# Patient Record
Sex: MALE | Race: WHITE | NOT HISPANIC OR LATINO
[De-identification: names, ages, dates, MRNs, and addresses within clinical notes are randomized per-mention and may not be internally consistent; named-entity substitution may affect disease eponyms.]

---

## 2020-12-29 ENCOUNTER — TRANSCRIBE ORDERS (OUTPATIENT)
Dept: OTHER | Age: 55
End: 2020-12-29

## 2020-12-29 DIAGNOSIS — I47.20 VENTRICULAR TACHYCARDIA (H): ICD-10-CM

## 2020-12-29 DIAGNOSIS — D86.85 CARDIAC SARCOIDOSIS: Primary | ICD-10-CM

## 2020-12-30 ENCOUNTER — CARE COORDINATION (OUTPATIENT)
Dept: CARDIOLOGY | Facility: CLINIC | Age: 55
End: 2020-12-30

## 2020-12-30 NOTE — PROGRESS NOTES
Referral- Heart Failure    REFERRING CLINIC INFORMATION:    Referring Clinic: [facility]  Referring Provider: Shoaib Moreira  Provider Contact Info: [phone]  Nursing Care Team Contact Info:     REFERRING PATIENT INFORMATION:    Patient Phone Number:   Home Phone [phone]   Mobile [phone]      Consent to Communicate: NOne found  Insurance Obtained: Yes       PATIENT/REFERRAL Hx:       December 30, 2020  Received staff message. Possible sarcoid. Recent imaging results are in CareEverywhere      IMAGING REQUESTED      December 30, 2020 - MRI 12/20 and Echo 12/20 have been pushed.   December 30, 2020 - Images received and in PACS    PLAN:       Sending to [facility] Nurse to Triage      ATTEMPTS TO CONTACT:  1. December 30, 2020 - None attempted yet.   2. January 6, 2021 - appointment made 1/19/21 with Dr. Garza.     Brad Burnette, [facility]  Heart Failure, Advanced Heart Failure & CORE  Referral Specialist &     [facility]  Cardiology  Office: [phone]  [phone]

## 2020-12-31 PROBLEM — E11.9 DM TYPE 2 (DIABETES MELLITUS, TYPE 2) (H): Status: ACTIVE | Noted: 2017-12-06

## 2020-12-31 PROBLEM — Z86.19 HISTORY OF HEPATITIS C: Status: ACTIVE | Noted: 2018-07-30

## 2020-12-31 PROBLEM — D12.6 ADENOMATOUS POLYP OF COLON: Status: ACTIVE | Noted: 2019-03-08

## 2020-12-31 PROBLEM — D69.6 THROMBOCYTOPENIA (H): Status: ACTIVE | Noted: 2018-10-24

## 2020-12-31 PROBLEM — R00.0 WIDE-COMPLEX TACHYCARDIA: Status: ACTIVE | Noted: 2020-12-24

## 2020-12-31 PROBLEM — K74.00 FIBROSIS OF LIVER: Status: ACTIVE | Noted: 2019-02-14

## 2020-12-31 RX ORDER — LANCETS
EACH MISCELLANEOUS
COMMUNITY
Start: 2020-04-10

## 2020-12-31 RX ORDER — ALBUTEROL SULFATE 90 UG/1
2 AEROSOL, METERED RESPIRATORY (INHALATION) EVERY 4 HOURS PRN
COMMUNITY
Start: 2020-09-14

## 2020-12-31 RX ORDER — PEN NEEDLE, DIABETIC 32GX 5/32"
NEEDLE, DISPOSABLE MISCELLANEOUS
COMMUNITY
Start: 2020-10-16

## 2020-12-31 RX ORDER — BLOOD SUGAR DIAGNOSTIC
STRIP MISCELLANEOUS
COMMUNITY
Start: 2020-10-16

## 2020-12-31 RX ORDER — IBUPROFEN 600 MG/1
600 TABLET, FILM COATED ORAL EVERY 6 HOURS PRN
COMMUNITY
Start: 2020-07-30

## 2020-12-31 RX ORDER — FLUTICASONE PROPIONATE 50 MCG
SPRAY, SUSPENSION (ML) NASAL
COMMUNITY
Start: 2020-09-14

## 2021-01-11 ENCOUNTER — CARE COORDINATION (OUTPATIENT)
Dept: CARDIOLOGY | Facility: CLINIC | Age: 56
End: 2021-01-11

## 2021-01-11 NOTE — PROGRESS NOTES
Patient is referred from Dr. Shoaib Moreira at /[facility] for possible cardiac sarcoid.  Recently hospitalized end of December (see note 12/122/20) with wide complex tachycardia. All records in care everywhere    1/11 - Called [facility] Imaging Dept and verified that patient's imaging (cardiac MRI and echo 12/2020) have been downloaded into our system. They are available for patient's video appointment on 1/19/20 with Dr. Garza. All notes and print results are in Care Everywhere.

## 2021-01-11 NOTE — TELEPHONE ENCOUNTER
Action    Action Taken 1-11: Requested from [facility]:    Angiogram    Cardiac MRI   12-23-20 12-24-20   1-11: Requested echo 12-23-20 from [facility] echo fax  1-12: echo and cMRI are in PACS  1-12: resolved angiogram in PACS

## 2021-01-17 NOTE — PROGRESS NOTES
"Brad Bruno is a 56 year old male who is being evaluated via a billable telephone visit.      The patient has been notified of following:   \"This telephone visit will be conducted via a call between you and your physician/provider. We have found that certain health care needs can be provided without the need for a physical exam.  This service lets us provide the care you need with a short phone conversation.  If a prescription is necessary we can send it directly to your pharmacy.  If lab work is needed we can place an order for that and you can then stop by our lab to have the test done at a later time. Telephone visits are billed at different rates depending on your insurance coverage. During this emergency period, for some insurers they may be billed the same as an in-person visit.  Please reach out to your insurance provider with any questions. If during the course of the call the physician/provider feels a telephone visit is not appropriate, you will not be charged for this service.\"    Patient has given verbal consent for Telephone visit?  YES    Telephone Visit Details  Type of service:  Telephone Visit  Telephone visit start time: 2pm  Telephone end time:2:35pm   Total telephone time: 35 minutes    Chief complaint: wide complex tachycardia     HPI:   Brad Bruno is a 56 year old male with history of DM Type 2, Liver fibrosis, VT s/p ICD 12/2020, NICM who presents for possible cardiac sarcoid evaluation.      presented to the ER on 12/2020 for palpitations and chest pain. EKG showed evidence of VT. He was then transferred to [facility] for further evaluation. Coronary angiogram showed no evidence of coronary artery disease. TTE showed borderline LVEF 50% and inferolateral WMA. EP study was done which showed VT and PVC with varying morphology. Cardiac MRI was done which showed various regions of late gadolinium enhancement suggestive of non ischemic cardiomyopathy and possible sarcoid " disease. He was not discharged on anti-arrhythmic agents or beta blockers and was advised outpatient follow-up.     Patient reports that he has no known cardiac condition prior to this. His job requires him to be physically active and he has had no limitations prior to his admission in 12/2020 and after his hospitalization. 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     He denies any chest pain, dyspnea at rest or with exertion, PND, orthopnea, peripheral edema, palpitations, lightheadedness or syncope. No reported ICD shocks    PAST MEDICAL HISTORY:  Liver fibrosis   DM type 2     CURRENT MEDICATIONS:  Current Outpatient Medications   Medication Sig Dispense Refill     albuterol (PROAIR HFA/PROVENTIL HFA/VENTOLIN HFA) 108 (90 Base) MCG/ACT inhaler Inhale 2 puffs into the lungs every 4 hours as needed for wheezing       blood glucose (ACCU-CHEK GUIDE) test strip USE TO TEST 3 TIMES DAILY.       blood glucose monitoring (ACCU-CHEK FASTCLIX) lancets USE TO TEST BLOOD SUGAR THREE TIMES A DAY       fluticasone (FLONASE) 50 MCG/ACT nasal spray PLACE 2 SPRAYS INTO BOTH NOSTRILS D. USE UP TO 1 MONTH       ibuprofen (ADVIL/MOTRIN) 600 MG tablet Take 600 mg by mouth every 6 hours as needed for pain       insulin glargine (LANTUS SOLOSTAR) 100 UNIT/ML pen Inject 15 Units Subcutaneous every evening       insulin pen needle (BD RONAL U/F) 32G X 4 MM miscellaneous        sitagliptin (JANUVIA) 50 MG tablet Take 50 mg by mouth daily         PAST SURGICAL HISTORY:  No past surgical history on file.    ALLERGIES:   No Known Allergies    FAMILY HISTORY:  No family history of premature CAD or sudden death.  Dad colon cancer  Mom breast cancer, lymphoma    SOCIAL HISTORY:  Social History     Tobacco Use     Smoking status: Not on file   Substance Use Topics     Alcohol use: Not on file     Drug use: Not on file   Medical marijuana for sleep     ROS:   A comprehensive  14 point review of systems is negative other than as mentioned in HPI.    Exam:  There were no vitals taken for this visit.     The rest of a comprehensive physical examination is deferred due to PHE (public health emergency) telephone visit restrictions.    Labs:  Reviewed.     Testing/Procedures:    I personally visualized and interpreted:  Outside records from [facility] were obtained, and relevant results/notes have been incorporated into HPI.    Outside results of note: [facility]   12/23 TTE:  Summary  1. Normal LV size with low normal systolic function. EF 50-55%.  Inferolateral wall is hypokinetic.  2. Mildly dilated aortic root at 4.5 cm and ascending aorta at 4 cm.  3. Normal valve function and structure.  4. Normal RV size and function.  5. Mild LA dilatation.  6. No prior studies for comparison.    12/23 Coronary Angiogram:  Conclusions  1. Hx of AFib vs VT, wall motion abnormality on echo.  2. Normal coronary arteries.  3. LVEDP 3mmHg.    12/24 MR Cardiac w/wo contrast:  Conclusions:   1. Mild left ventricular enlargement. Normal left ventricular wall   thickness. The BMI-adjusted left   ventricular mass is normal. Mildly reduced left ventricular systolic   function, LVEF 51%. Basal   inferolateral hypokinesis. Base to mid inferolateral, basal inferior mid   myocardial and basal septal late   gadolinium enhancement noted. Small area of distal inferolateral and mid   anterolateral subendocardial   late gadolinium enhancement. Near transmural basal anterolateral late   gadolinium enhancement. Overall   findings suggestive of non-ischemic cardiomyopathy (consider Sarcoid   Cardiomyopathy).   2. Normal RV size and function.   3. Mild to moderate biatrial enlargement.   4. Dilated aortic root measures 4.2 cm at the level of the Sinus of   Valsalva.     Procedures:   12/23 EPS revealing inducible VT with different morphology from clinical arrhythmia and also pleomorphic PVCs. Patient also with  significant AVN conduction disease with AV block at low atrial pacing rates.    12/24 Dual chamber ICD implant:   Conclusions   Successful dual chamber ICDimplant   VF induction with T wave shock --> VF--> VT    (LBB morphology , LAD)--> shock 15 J --> NSR   VF induction with 50 Hz pacing--> VF--->15 J shock-->   NSR     Assessment and Plan:   Brad Bruno is a 56 year old male with history of DM Type 2, Liver fibrosis, VT s/p ICD 12/2020, NICM who presents for possible cardiac sarcoid evaluation.     1. VT, polymorphic s/p Medtronic dual chamber ICD   2. Possible cardiac sarcoidosis   Patient presented to [facility] with VT. EP study showed inducible VT and PVC of varying morphology.No evidence of coronary disease or angiogram. He underwent DCICD placement on 12/24/20. LVEF was noted to be at 50%. Cardiac MRI is suggestive of non-ischemic cardiomyopathy possibly sarcoidosis although findings are not definitive. He is currently not on any anti-arrhythmics or beta blocker. He is doing well and is asymptomatic with no limitation in his physical activities. Before proceeding with long-term steroids, we will proceed with further evaluation as outlined below.     - PET scan to evaluate for possible cardiac sarcoidosis or other extracardiac tissue that may allow for biopsy   - If results of PET scan do not show any evidence of sarcoidosis, will proceed with genetic testing     Follow-up after PET scan     The patient states understanding and is agreeable with plan.   Plan discussed with Dr.Markowitz Yamile Hand MD  PGY 5 Cardiology     ATTENDING ATTESTATION     This telephone visit was conducted jointly with Dr. Hand; I was present for its full duration. Patient was seen and evaluated with Dr. Hand. History was confirmed personally by me. Labs, imaging studies, EKGs, and telemetry were reviewed. Agree with assessment and plan as outlined above. The note has been edited by me as needed to  produce a single, cohesive document.     Total telephone time: 35 minutes    Sean Garza MD  Staff Cardiologist    [facility]

## 2021-01-19 ENCOUNTER — VIRTUAL VISIT (OUTPATIENT)
Dept: CARDIOLOGY | Facility: CLINIC | Age: 56
End: 2021-01-19
Attending: INTERNAL MEDICINE
Payer: COMMERCIAL

## 2021-01-19 ENCOUNTER — PRE VISIT (OUTPATIENT)
Dept: CARDIOLOGY | Facility: CLINIC | Age: 56
End: 2021-01-19

## 2021-01-19 DIAGNOSIS — D86.85 CARDIAC SARCOIDOSIS: Primary | ICD-10-CM

## 2021-01-19 PROCEDURE — 99203 OFFICE O/P NEW LOW 30 MIN: CPT | Mod: TEL | Performed by: INTERNAL MEDICINE

## 2021-01-19 NOTE — NURSING NOTE
Chief Complaint   Patient presents with     Palpitations     I47.2 (ICD-10-CM) - Ventricular tachycardia (H),Cardiac sarcoidosis, patient states no heart symptoms        Initial There were no vitals taken for this visit. There is no height or weight on file to calculate BMI..  BP completed using cuff size: large    Meg Wade L.P.N.

## 2021-01-19 NOTE — PATIENT INSTRUCTIONS
Preventive Care:    Colorectal Cancer Screening: During our visit today, we discussed that it is recommended you receive colorectal cancer screening. Please call or make an appointment with your primary care provider to discuss this. You may also call the [facility] scheduling line ([phone]) to set up a colonoscopy appointment.    You had a telephone visit with Dr. Sean Garza  Here are your Instructions:  1. You will need to undergo PET scan for further evaluation of possible cardiac sarcoidosis

## 2021-01-19 NOTE — NURSING NOTE
Scheduling for sarcoid PET testing.  Message sent to [facility]. Brad has no questions at this time.  Will schedule follow for after PET testing.

## 2021-01-19 NOTE — LETTER
"1/19/2021      RE: Brad Bruno  [address] [address]       Dear Colleague,    Thank you for the opportunity to participate in the care of your patient, Brad Bruno, at the [facility]. Please see a copy of my visit note below.    Brad Bruno is a 56 year old male who is being evaluated via a billable telephone visit.      The patient has been notified of following:   \"This telephone visit will be conducted via a call between you and your physician/provider. We have found that certain health care needs can be provided without the need for a physical exam.  This service lets us provide the care you need with a short phone conversation.  If a prescription is necessary we can send it directly to your pharmacy.  If lab work is needed we can place an order for that and you can then stop by our lab to have the test done at a later time. Telephone visits are billed at different rates depending on your insurance coverage. During this emergency period, for some insurers they may be billed the same as an in-person visit.  Please reach out to your insurance provider with any questions. If during the course of the call the physician/provider feels a telephone visit is not appropriate, you will not be charged for this service.\"    Patient has given verbal consent for Telephone visit?  YES    Telephone Visit Details  Type of service:  Telephone Visit  Telephone visit start time: 2pm  Telephone end time:2:35pm   Total telephone time: 35 minutes    Chief complaint: wide complex tachycardia     HPI:   Brad Bruno is a 56 year old male with history of DM Type 2, Liver fibrosis, VT s/p ICD 12/2020, NICM who presents for possible cardiac sarcoid evaluation.      presented to the ER on 12/2020 for palpitations and chest pain. EKG showed evidence of VT. He was then transferred to [facility] for further " evaluation. Coronary angiogram showed no evidence of coronary artery disease. TTE showed borderline LVEF 50% and inferolateral WMA. EP study was done which showed VT and PVC with varying morphology. Cardiac MRI was done which showed various regions of late gadolinium enhancement suggestive of non ischemic cardiomyopathy and possible sarcoid disease. He was not discharged on anti-arrhythmic agents or beta blockers and was advised outpatient follow-up.     Patient reports that he has no known cardiac condition prior to this. His job requires him to be physically active and he has had no limitations prior to his admission in 12/2020 and after his hospitalization. 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     He denies any chest pain, dyspnea at rest or with exertion, PND, orthopnea, peripheral edema, palpitations, lightheadedness or syncope. No reported ICD shocks    PAST MEDICAL HISTORY:  Liver fibrosis   DM type 2     CURRENT MEDICATIONS:  Current Outpatient Medications   Medication Sig Dispense Refill     albuterol (PROAIR HFA/PROVENTIL HFA/VENTOLIN HFA) 108 (90 Base) MCG/ACT inhaler Inhale 2 puffs into the lungs every 4 hours as needed for wheezing       blood glucose (ACCU-CHEK GUIDE) test strip USE TO TEST 3 TIMES DAILY.       blood glucose monitoring (ACCU-CHEK FASTCLIX) lancets USE TO TEST BLOOD SUGAR THREE TIMES A DAY       fluticasone (FLONASE) 50 MCG/ACT nasal spray PLACE 2 SPRAYS INTO BOTH NOSTRILS D. USE UP TO 1 MONTH       ibuprofen (ADVIL/MOTRIN) 600 MG tablet Take 600 mg by mouth every 6 hours as needed for pain       insulin glargine (LANTUS SOLOSTAR) 100 UNIT/ML pen Inject 15 Units Subcutaneous every evening       insulin pen needle (BD RONAL U/F) 32G X 4 MM miscellaneous        sitagliptin (JANUVIA) 50 MG tablet Take 50 mg by mouth daily         PAST SURGICAL HISTORY:  No past surgical history on file.    ALLERGIES:   No Known  Allergies    FAMILY HISTORY:  No family history of premature CAD or sudden death.  Dad colon cancer  Mom breast cancer, lymphoma    SOCIAL HISTORY:  Social History     Tobacco Use     Smoking status: Not on file   Substance Use Topics     Alcohol use: Not on file     Drug use: Not on file   Medical marijuana for sleep     ROS:   A comprehensive 14 point review of systems is negative other than as mentioned in HPI.    Exam:  There were no vitals taken for this visit.     The rest of a comprehensive physical examination is deferred due to PHE (public health emergency) telephone visit restrictions.    Labs:  Reviewed.     Testing/Procedures:    I personally visualized and interpreted:  Outside records from [facility] were obtained, and relevant results/notes have been incorporated into HPI.    Outside results of note: [facility]   12/23 TTE:  Summary  1. Normal LV size with low normal systolic function. EF 50-55%.  Inferolateral wall is hypokinetic.  2. Mildly dilated aortic root at 4.5 cm and ascending aorta at 4 cm.  3. Normal valve function and structure.  4. Normal RV size and function.  5. Mild LA dilatation.  6. No prior studies for comparison.    12/23 Coronary Angiogram:  Conclusions  1. Hx of AFib vs VT, wall motion abnormality on echo.  2. Normal coronary arteries.  3. LVEDP 3mmHg.    12/24 MR Cardiac w/wo contrast:  Conclusions:   1. Mild left ventricular enlargement. Normal left ventricular wall   thickness. The BMI-adjusted left   ventricular mass is normal. Mildly reduced left ventricular systolic   function, LVEF 51%. Basal   inferolateral hypokinesis. Base to mid inferolateral, basal inferior mid   myocardial and basal septal late   gadolinium enhancement noted. Small area of distal inferolateral and mid   anterolateral subendocardial   late gadolinium enhancement. Near transmural basal anterolateral late   gadolinium enhancement. Overall   findings suggestive of non-ischemic cardiomyopathy  (consider Sarcoid   Cardiomyopathy).   2. Normal RV size and function.   3. Mild to moderate biatrial enlargement.   4. Dilated aortic root measures 4.2 cm at the level of the Sinus of   Valsalva.     Procedures:   12/23 EPS revealing inducible VT with different morphology from clinical arrhythmia and also pleomorphic PVCs. Patient also with significant AVN conduction disease with AV block at low atrial pacing rates.    12/24 Dual chamber ICD implant:   Conclusions   Successful dual chamber ICDimplant   VF induction with T wave shock --> VF--> VT    (LBB morphology , LAD)--> shock 15 J --> NSR   VF induction with 50 Hz pacing--> VF--->15 J shock-->   NSR     Assessment and Plan:   Brad Bruno is a 56 year old male with history of DM Type 2, Liver fibrosis, VT s/p ICD 12/2020, NICM who presents for possible cardiac sarcoid evaluation.     1. VT, polymorphic s/p Medtronic dual chamber ICD   2. Possible cardiac sarcoidosis   Patient presented to [facility] with VT. EP study showed inducible VT and PVC of varying morphology.No evidence of coronary disease or angiogram. He underwent DCICD placement on 12/24/20. LVEF was noted to be at 50%. Cardiac MRI is suggestive of non-ischemic cardiomyopathy possibly sarcoidosis although findings are not definitive. He is currently not on any anti-arrhythmics or beta blocker. He is doing well and is asymptomatic with no limitation in his physical activities. Before proceeding with long-term steroids, we will proceed with further evaluation as outlined below.     - PET scan to evaluate for possible cardiac sarcoidosis or other extracardiac tissue that may allow for biopsy   - If results of PET scan do not show any evidence of sarcoidosis, will proceed with genetic testing     Follow-up after PET scan     The patient states understanding and is agreeable with plan.   Plan discussed with Dr.Markowitz Yamile Hand MD  PGY 5 Cardiology     ATTENDING ATTESTATION      This telephone visit was conducted jointly with Dr. Hand; I was present for its full duration. Patient was seen and evaluated with Dr. Hand. History was confirmed personally by me. Labs, imaging studies, EKGs, and telemetry were reviewed. Agree with assessment and plan as outlined above. The note has been edited by me as needed to produce a single, cohesive document.     Total telephone time: 35 minutes    Sean Garza MD  Staff Cardiologist    [facility]            Please do not hesitate to contact me if you have any questions/concerns.     Sincerely,     Sean Garza MD

## 2021-01-22 ENCOUNTER — PATIENT OUTREACH (OUTPATIENT)
Dept: CARDIOLOGY | Facility: CLINIC | Age: 56
End: 2021-01-22

## 2021-01-22 NOTE — TELEPHONE ENCOUNTER
Confirmed with Brad that he had PET scheduled and found in person Sarcoid spot to meet with Dr Garza and go over results- 3/2 at 11:30, labs at 11.  Will need to send notification when scheduled.

## 2021-02-11 ENCOUNTER — ANCILLARY PROCEDURE (OUTPATIENT)
Dept: PET IMAGING | Facility: CLINIC | Age: 56
End: 2021-02-11
Attending: INTERNAL MEDICINE
Payer: COMMERCIAL

## 2021-02-11 DIAGNOSIS — D86.85 CARDIAC SARCOIDOSIS: ICD-10-CM

## 2021-02-11 LAB — GLUCOSE SERPL-MCNC: 113 MG/DL (ref 70–99)

## 2021-02-12 ENCOUNTER — PATIENT OUTREACH (OUTPATIENT)
Dept: CARDIOLOGY | Facility: CLINIC | Age: 56
End: 2021-02-12

## 2021-02-12 DIAGNOSIS — I50.22 CHRONIC SYSTOLIC HEART FAILURE (H): Primary | ICD-10-CM

## 2021-02-12 NOTE — TELEPHONE ENCOUNTER
Reviewed PET results with Dr Garza- noted that EF was 38%, last noted EF in December in 50's.      Date: 2/12/2021    Time of Call: 11:03 AM     Diagnosis:  Heart failure     [ VORB ] Ordering provider: Dr SELAM Garza  Order: Complete Echo     Order received by: Iliana Rai RN       Follow-up/additional notes: went over testing results with Brad, explained reasoning behind Echo.  Scheduled echo on agreed date and sent [employer] message to Brad per his request with time and location.

## 2021-02-24 ENCOUNTER — ANCILLARY PROCEDURE (OUTPATIENT)
Dept: CARDIOLOGY | Facility: CLINIC | Age: 56
End: 2021-02-24
Attending: INTERNAL MEDICINE
Payer: COMMERCIAL

## 2021-02-24 DIAGNOSIS — I50.22 CHRONIC SYSTOLIC HEART FAILURE (H): ICD-10-CM

## 2021-02-24 PROCEDURE — 93306 TTE W/DOPPLER COMPLETE: CPT | Performed by: INTERNAL MEDICINE

## 2021-02-26 ENCOUNTER — PATIENT OUTREACH (OUTPATIENT)
Dept: CARDIOLOGY | Facility: CLINIC | Age: 56
End: 2021-02-26

## 2021-02-26 ENCOUNTER — TEAM CONFERENCE (OUTPATIENT)
Dept: CARDIOLOGY | Facility: CLINIC | Age: 56
End: 2021-02-26

## 2021-02-26 DIAGNOSIS — D86.85 CARDIAC SARCOIDOSIS: Primary | ICD-10-CM

## 2021-02-26 DIAGNOSIS — I42.8 NICM (NONISCHEMIC CARDIOMYOPATHY) (H): Primary | ICD-10-CM

## 2021-02-26 NOTE — TELEPHONE ENCOUNTER
Sarcoid Multidisciplinary Conference      Patient name: Brad KAUFFMAN Kiana    Physician: Sean Garza    Question for multidisciplinary group:  No sarcoid, plan for follow up?     Relevant medical history: VT in 12/2020, ICD placed, CMRI showed late gadolinium enhancement    Radiology interpretation: Nothing to suggest sarcoid in the PET or on the MRI Demetrio Bell MD,     Plan: Follow up with Dr Sage for EP and with Spring Ramirez     Updated patient, he was agreeable to the plan but asked that his follow up be pushed back.  He will call and reschedule

## 2021-03-01 ASSESSMENT — ENCOUNTER SYMPTOMS
LEG PAIN: 0
HYPOTENSION: 0
SLEEP DISTURBANCES DUE TO BREATHING: 0
ORTHOPNEA: 0
SYNCOPE: 0
LIGHT-HEADEDNESS: 0
HYPERTENSION: 0
PALPITATIONS: 1
EXERCISE INTOLERANCE: 0

## 2021-03-02 ENCOUNTER — TELEPHONE (OUTPATIENT)
Dept: CARDIOLOGY | Facility: CLINIC | Age: 56
End: 2021-03-02

## 2021-03-02 ENCOUNTER — OFFICE VISIT (OUTPATIENT)
Dept: CARDIOLOGY | Facility: CLINIC | Age: 56
End: 2021-03-02
Attending: INTERNAL MEDICINE
Payer: COMMERCIAL

## 2021-03-02 VITALS
HEART RATE: 69 BPM | HEIGHT: 72 IN | BODY MASS INDEX: 28.01 KG/M2 | WEIGHT: 206.8 LBS | OXYGEN SATURATION: 95 % | DIASTOLIC BLOOD PRESSURE: 76 MMHG | SYSTOLIC BLOOD PRESSURE: 125 MMHG

## 2021-03-02 DIAGNOSIS — D86.85 CARDIAC SARCOIDOSIS: ICD-10-CM

## 2021-03-02 DIAGNOSIS — I47.20 VENTRICULAR TACHYCARDIA (H): ICD-10-CM

## 2021-03-02 LAB
ANION GAP SERPL CALCULATED.3IONS-SCNC: 4 MMOL/L (ref 3–14)
BUN SERPL-MCNC: 11 MG/DL (ref 7–30)
CALCIUM SERPL-MCNC: 9.3 MG/DL (ref 8.5–10.1)
CHLORIDE SERPL-SCNC: 109 MMOL/L (ref 94–109)
CO2 SERPL-SCNC: 27 MMOL/L (ref 20–32)
CREAT SERPL-MCNC: 0.74 MG/DL (ref 0.66–1.25)
GFR SERPL CREATININE-BSD FRML MDRD: >90 ML/MIN/{1.73_M2}
GLUCOSE SERPL-MCNC: 260 MG/DL (ref 70–99)
NT-PROBNP SERPL-MCNC: 182 PG/ML (ref 0–125)
POTASSIUM SERPL-SCNC: 4 MMOL/L (ref 3.4–5.3)
SODIUM SERPL-SCNC: 140 MMOL/L (ref 133–144)
TROPONIN I SERPL-MCNC: 0.03 UG/L (ref 0–0.04)

## 2021-03-02 PROCEDURE — 80048 BASIC METABOLIC PNL TOTAL CA: CPT | Performed by: PATHOLOGY

## 2021-03-02 PROCEDURE — G0463 HOSPITAL OUTPT CLINIC VISIT: HCPCS

## 2021-03-02 PROCEDURE — 36415 COLL VENOUS BLD VENIPUNCTURE: CPT | Performed by: PATHOLOGY

## 2021-03-02 PROCEDURE — 99215 OFFICE O/P EST HI 40 MIN: CPT | Performed by: INTERNAL MEDICINE

## 2021-03-02 PROCEDURE — 83880 ASSAY OF NATRIURETIC PEPTIDE: CPT | Performed by: PATHOLOGY

## 2021-03-02 PROCEDURE — 84484 ASSAY OF TROPONIN QUANT: CPT | Performed by: PATHOLOGY

## 2021-03-02 ASSESSMENT — PAIN SCALES - GENERAL: PAINLEVEL: NO PAIN (0)

## 2021-03-02 ASSESSMENT — MIFFLIN-ST. JEOR: SCORE: 1796.04

## 2021-03-02 NOTE — PATIENT INSTRUCTIONS
"Cardiology Providers you saw during your visit:  Dr. Garza    Medication changes:  1. No changes    Follow up:  1.  Follow up with Spring Ramirez for genetics depending on your insurance coverage  2.  Follow up with Dr Garza in 6 months  3.  Follow up with Dr Sage at [facility] as arranged.    https://www.ssa.gov/disability/professionals/bluebook/4.00-Cardiovascular-Adult.htm#4_05    Labs:  Results for JOSELITO CHO (MRN [id number]) as of 3/2/2021 12:07   Ref. Range 3/2/2021 10:51   Sodium Latest Ref Range: 133 - 144 mmol/L 140   Potassium Latest Ref Range: 3.4 - 5.3 mmol/L 4.0   Chloride Latest Ref Range: 94 - 109 mmol/L 109   Carbon Dioxide Latest Ref Range: 20 - 32 mmol/L 27   Urea Nitrogen Latest Ref Range: 7 - 30 mg/dL 11   Creatinine Latest Ref Range: 0.66 - 1.25 mg/dL 0.74   GFR Estimate Latest Ref Range: >60 mL/min/1.73_m2 >90   GFR Estimate If Black Latest Ref Range: >60 mL/min/1.73_m2 >90   Calcium Latest Ref Range: 8.5 - 10.1 mg/dL 9.3   Anion Gap Latest Ref Range: 3 - 14 mmol/L 4   N-Terminal Pro Bnp Latest Ref Range: 0 - 125 pg/mL 182 (H)   Troponin I ES Latest Ref Range: 0.000 - 0.045 ug/L 0.031   Glucose Latest Ref Range: 70 - 99 mg/dL 260 (H)       Please call if you have :  1. Weight gain of more than 2 pounds in a day or 5 pounds in a week  2. Increased shortness of breath, swelling or bloating  3. Dizziness, lightheadedness   4. Any questions or concerns.       Follow the American Heart Association Diet and Lifestyle recommendations:  Limit saturated fat, trans fat, sodium, red meat, sweets and sugar-sweetened beverages. If you choose to eat red meat, compare labels and select the leanest cuts available.  Aim for at least 150 minutes of moderate physical activity or 75 minutes of vigorous physical activity - or an equal combination of both - each week.      During business hours: [phone], press option # 1 to be routed to the [employer] then option # 4 for \"To send a message to your " "care team\"      After hours, weekends or holidays: On Call Cardiologist- [phone]   option #4 and ask to speak to the on-call Cardiologist. Inform them you are a CORE/heart failure patient at the [place].      Iliana Rai RN BSN CHFN  Cardiology Care Coordinator - Heart Failure/ [facility]  [facility]   Questions and [phone]   First press #1 for the [place] and then press #4 for \"To send a message to your care team\"    "

## 2021-03-02 NOTE — NURSING NOTE
Diet: Patient instructed regarding a heart failure healthy diet, including discussion of reduced fat and 2000 mg daily sodium restriction, daily weights, medication purpose and compliance, fluid restrictions and resources for patient and family to access for assistance with heart failure management.       Labs: Patient was given results of the laboratory testing obtained today and patient was instructed about when to return for the next laboratory testing.     Med Reconcile: Reviewed and verified all current medications with the patient. The updated medication list was printed and given to the patient.     Med changes: none    Return Appointment: Patient given instructions regarding scheduling next clinic visit: Follow up with Genetics if insurance allows, follow up with Dr Garza in 6 months    Patient stated he understood all health information given and agreed to call with further questions or concerns.     Iliana Rai RN

## 2021-03-02 NOTE — NURSING NOTE
Chief Complaint   Patient presents with     Follow Up     Return HF     Vitals were taken and medication reconciled.    LAWSON Schmidt  11:26 AM

## 2021-03-02 NOTE — LETTER
3/2/2021      RE: Brad Bruno  [address] [address]       Dear Colleague,    Thank you for the opportunity to participate in the care of your patient, Brad Bruno, at the [facility] [place] at [facility]. Please see a copy of my visit note below.    Chief complaint: wide complex tachycardia     HPI:   Brad Bruno is a 56 year old male with history of DM Type 2, Liver fibrosis, VT s/p ICD 12/2020, NICM who presents for possible cardiac sarcoid evaluation.      presented to the ER on 12/2020 for palpitations and chest pain. EKG showed evidence of VT. He was then transferred to [facility] for further evaluation. Coronary angiogram showed no evidence of coronary artery disease. TTE showed borderline LVEF 50% and inferolateral WMA. EP study was done which showed VT and PVC with varying morphology. Cardiac MRI was done which showed various regions of late gadolinium enhancement suggestive of non ischemic cardiomyopathy and possible sarcoid disease. He was not discharged on anti-arrhythmic agents or beta blockers and was advised outpatient follow-up.     Patient reports that he has no known cardiac condition prior to this. His job requires him to be physically active and he has had no limitations prior to his admission in 12/2020 and after his hospitalization. 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     Interval history 3/2/2021  Brad reports feeling generally well. His only complaint is occasional palpitations (sensation of single skipped heartbeat followed by a strong heartbeat.) No change in exertional capacity.   He denies  any chest pain, dyspnea at rest or with exertion, PND, orthopnea, peripheral edema, lightheadedness or syncope. No reported ICD shocks    PAST MEDICAL HISTORY:  Liver fibrosis   DM type 2     CURRENT  MEDICATIONS:  Current Outpatient Medications   Medication Sig Dispense Refill     albuterol (PROAIR HFA/PROVENTIL HFA/VENTOLIN HFA) 108 (90 Base) MCG/ACT inhaler Inhale 2 puffs into the lungs every 4 hours as needed for wheezing       blood glucose (ACCU-CHEK GUIDE) test strip USE TO TEST 3 TIMES DAILY.       blood glucose monitoring (ACCU-CHEK FASTCLIX) lancets USE TO TEST BLOOD SUGAR THREE TIMES A DAY       fluticasone (FLONASE) 50 MCG/ACT nasal spray PLACE 2 SPRAYS INTO BOTH NOSTRILS D. USE UP TO 1 MONTH       ibuprofen (ADVIL/MOTRIN) 600 MG tablet Take 600 mg by mouth every 6 hours as needed for pain       insulin glargine (LANTUS SOLOSTAR) 100 UNIT/ML pen Inject 15 Units Subcutaneous every evening       insulin pen needle (BD RONAL U/F) 32G X 4 MM miscellaneous        sitagliptin (JANUVIA) 50 MG tablet Take 50 mg by mouth daily         PAST SURGICAL HISTORY:  No past surgical history on file.    ALLERGIES:   No Known Allergies    FAMILY HISTORY:  No family history of premature CAD or sudden death.  Dad colon cancer  Mom breast cancer, lymphoma    SOCIAL HISTORY:  Social History     Tobacco Use     Smoking status: Former Smoker     Smokeless tobacco: Never Used   Substance Use Topics     Alcohol use: Not Currently     Drug use: Yes     Types: Marijuana   Medical marijuana for sleep     ROS:   A comprehensive 14 point review of systems is negative other than as mentioned in HPI.    Exam:  There were no vitals taken for this visit.     The rest of a comprehensive physical examination is deferred due to PHE (public health emergency) telephone visit restrictions.    Labs:  Reviewed.     Testing/Procedures:    I personally visualized and interpreted:  CMR 12/24/20 ([facility]): Borderine-reduced LV function. Midmyocardial septal LGE, extensive lateral wall LGE, which appears confluent on some views.     NH3/FDG-PET 2/11/21:   No cardiac FDG uptake to suggest active cardiac sarcoidosis.  No extracardiac FDG uptake to  suggest systemic/extracardiac sarcoidosis.  No focal perfusion defects.  ?reduced LVEF on gated PET    TTE 2/24/21 (read by me):  Borderline (EF 50-55%) reduced left ventricular function is present. LVEF 53%  based on biplane 2D tracing.  Right ventricular function, chamber size, wall motion, and thickness are  normal.  No significant valvular abnormalities were noted.  Previous study not available for comparison.    Outside records from [facility] were obtained, and relevant results/notes have been incorporated into HPI.    Outside results of note: [facility]   12/23 TTE:  Summary  1. Normal LV size with low normal systolic function. EF 50-55%.  Inferolateral wall is hypokinetic.  2. Mildly dilated aortic root at 4.5 cm and ascending aorta at 4 cm.  3. Normal valve function and structure.  4. Normal RV size and function.  5. Mild LA dilatation.  6. No prior studies for comparison.    12/23 Coronary Angiogram:  Conclusions  1. Hx of AFib vs VT, wall motion abnormality on echo.  2. Normal coronary arteries.  3. LVEDP 3mmHg.    12/24 MR Cardiac w/wo contrast:  Conclusions:   1. Mild left ventricular enlargement. Normal left ventricular wall   thickness. The BMI-adjusted left   ventricular mass is normal. Mildly reduced left ventricular systolic   function, LVEF 51%. Basal   inferolateral hypokinesis. Base to mid inferolateral, basal inferior mid   myocardial and basal septal late   gadolinium enhancement noted. Small area of distal inferolateral and mid   anterolateral subendocardial   late gadolinium enhancement. Near transmural basal anterolateral late   gadolinium enhancement. Overall   findings suggestive of non-ischemic cardiomyopathy (consider Sarcoid   Cardiomyopathy).   2. Normal RV size and function.   3. Mild to moderate biatrial enlargement.   4. Dilated aortic root measures 4.2 cm at the level of the Sinus of   Valsalva.     Procedures:   12/23 EPS revealing inducible VT with different  morphology from clinical arrhythmia and also pleomorphic PVCs. Patient also with significant AVN conduction disease with AV block at low atrial pacing rates.    12/24 Dual chamber ICD implant:   Conclusions   Successful dual chamber ICDimplant   VF induction with T wave shock --> VF--> VT    (LBB morphology , LAD)--> shock 15 J --> NSR   VF induction with 50 Hz pacing--> VF--->15 J shock-->   NSR     Assessment and Plan:   Brad Bruno is a 56 year old male with history of DM Type 2, Liver fibrosis, VT s/p ICD 12/2020, NICM who presents for possible cardiac sarcoid evaluation.     1. VT, polymorphic s/p Medtronic dual chamber ICD   2. NICM with significant LGE on CMR, LVEF~50% of unclear etiology   Imaging and history was reviewed at our Multidisciplinary Sarcoidosis Conference.   FDG-PET shows no evidence of cardiac or extracardiac sarcoidosis.   Based on recent TTE 2/24/21 (read by me), the reduced LVEF reported from gated PET appears to be erroneous and his LVEF remains stable I the 50-55% range.   CMR, while clearly showing a cardiomyopathy with signficant LGE, is not typical of cardiac sarcoidosis. This particular pattern of LGE is somewhat more suggestive of a genetic cardiomyopathy, some of which (e.g., LMNA, Titin) can be associated with ventricular arrhythmias and conduction disease. Genetic counseling referral for genetic testing, which may be of value both for diagnostic purposes and potentially to facilitate cascade screening of first-degree relatives if a pathogenic mutation is identified.   -CV genetic counseling referral for possible genetic cardiomyopathy  -continue EP follow up with Dr. Moreira at [facility]  -follow up in 6 months    Chart review time: 20 minutes  Visit time: 36 minutes  Total time: 56    Sean Garza MD  Cardiology

## 2021-03-02 NOTE — PROGRESS NOTES
Chief complaint: wide complex tachycardia     HPI:   Brad Bruno is a 56 year old male with history of DM Type 2, Liver fibrosis, VT s/p ICD 12/2020, NICM who presents for possible cardiac sarcoid evaluation.      presented to the ER on 12/2020 for palpitations and chest pain. EKG showed evidence of VT. He was then transferred to [facility] for further evaluation. Coronary angiogram showed no evidence of coronary artery disease. TTE showed borderline LVEF 50% and inferolateral WMA. EP study was done which showed VT and PVC with varying morphology. Cardiac MRI was done which showed various regions of late gadolinium enhancement suggestive of non ischemic cardiomyopathy and possible sarcoid disease. He was not discharged on anti-arrhythmic agents or beta blockers and was advised outpatient follow-up.     Patient reports that he has no known cardiac condition prior to this. His job requires him to be physically active and he has had no limitations prior to his admission in 12/2020 and after his hospitalization. 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     Interval history 3/2/2021  Brad reports feeling generally well. His only complaint is occasional palpitations (sensation of single skipped heartbeat followed by a strong heartbeat.) No change in exertional capacity.   He denies  any chest pain, dyspnea at rest or with exertion, PND, orthopnea, peripheral edema, lightheadedness or syncope. No reported ICD shocks    PAST MEDICAL HISTORY:  Liver fibrosis   DM type 2     CURRENT MEDICATIONS:  Current Outpatient Medications   Medication Sig Dispense Refill     albuterol (PROAIR HFA/PROVENTIL HFA/VENTOLIN HFA) 108 (90 Base) MCG/ACT inhaler Inhale 2 puffs into the lungs every 4 hours as needed for wheezing       blood glucose (ACCU-CHEK GUIDE) test strip USE TO TEST 3 TIMES DAILY.       blood glucose monitoring (ACCU-CHEK FASTCLIX)  lancets USE TO TEST BLOOD SUGAR THREE TIMES A DAY       fluticasone (FLONASE) 50 MCG/ACT nasal spray PLACE 2 SPRAYS INTO BOTH NOSTRILS D. USE UP TO 1 MONTH       ibuprofen (ADVIL/MOTRIN) 600 MG tablet Take 600 mg by mouth every 6 hours as needed for pain       insulin glargine (LANTUS SOLOSTAR) 100 UNIT/ML pen Inject 15 Units Subcutaneous every evening       insulin pen needle (BD RONAL U/F) 32G X 4 MM miscellaneous        sitagliptin (JANUVIA) 50 MG tablet Take 50 mg by mouth daily         PAST SURGICAL HISTORY:  No past surgical history on file.    ALLERGIES:   No Known Allergies    FAMILY HISTORY:  No family history of premature CAD or sudden death.  Dad colon cancer  Mom breast cancer, lymphoma    SOCIAL HISTORY:  Social History     Tobacco Use     Smoking status: Former Smoker     Smokeless tobacco: Never Used   Substance Use Topics     Alcohol use: Not Currently     Drug use: Yes     Types: Marijuana   Medical marijuana for sleep     ROS:   A comprehensive 14 point review of systems is negative other than as mentioned in HPI.    Exam:  There were no vitals taken for this visit.     The rest of a comprehensive physical examination is deferred due to [facility] (public health emergency) telephone visit restrictions.    Labs:  Reviewed.     Testing/Procedures:    I personally visualized and interpreted:  CMR 12/24/20 ([facility]): Borderine-reduced LV function. Midmyocardial septal LGE, extensive lateral wall LGE, which appears confluent on some views.     NH3/FDG-PET 2/11/21:   No cardiac FDG uptake to suggest active cardiac sarcoidosis.  No extracardiac FDG uptake to suggest systemic/extracardiac sarcoidosis.  No focal perfusion defects.  ?reduced LVEF on gated PET    TTE 2/24/21 (read by me):  Borderline (EF 50-55%) reduced left ventricular function is present. LVEF 53%  based on biplane 2D tracing.  Right ventricular function, chamber size, wall motion, and thickness are  normal.  No significant valvular abnormalities  were noted.  Previous study not available for comparison.    Outside records from [facility] were obtained, and relevant results/notes have been incorporated into HPI.    Outside results of note: [facility]   12/23 TTE:  Summary  1. Normal LV size with low normal systolic function. EF 50-55%.  Inferolateral wall is hypokinetic.  2. Mildly dilated aortic root at 4.5 cm and ascending aorta at 4 cm.  3. Normal valve function and structure.  4. Normal RV size and function.  5. Mild LA dilatation.  6. No prior studies for comparison.    12/23 Coronary Angiogram:  Conclusions  1. Hx of AFib vs VT, wall motion abnormality on echo.  2. Normal coronary arteries.  3. LVEDP 3mmHg.    12/24 MR Cardiac w/wo contrast:  Conclusions:   1. Mild left ventricular enlargement. Normal left ventricular wall   thickness. The BMI-adjusted left   ventricular mass is normal. Mildly reduced left ventricular systolic   function, LVEF 51%. Basal   inferolateral hypokinesis. Base to mid inferolateral, basal inferior mid   myocardial and basal septal late   gadolinium enhancement noted. Small area of distal inferolateral and mid   anterolateral subendocardial   late gadolinium enhancement. Near transmural basal anterolateral late   gadolinium enhancement. Overall   findings suggestive of non-ischemic cardiomyopathy (consider Sarcoid   Cardiomyopathy).   2. Normal RV size and function.   3. Mild to moderate biatrial enlargement.   4. Dilated aortic root measures 4.2 cm at the level of the Sinus of   Valsalva.     Procedures:   12/23 EPS revealing inducible VT with different morphology from clinical arrhythmia and also pleomorphic PVCs. Patient also with significant AVN conduction disease with AV block at low atrial pacing rates.    12/24 Dual chamber ICD implant:   Conclusions   Successful dual chamber ICDimplant   VF induction with T wave shock --> VF--> VT    (LBB morphology , LAD)--> shock 15 J --> NSR   VF induction with 50  Hz pacing--> VF--->15 J shock-->   NSR     Assessment and Plan:   Brad Bruno is a 56 year old male with history of DM Type 2, Liver fibrosis, VT s/p ICD 12/2020, NICM who presents for possible cardiac sarcoid evaluation.     1. VT, polymorphic s/p Medtronic dual chamber ICD   2. NICM with significant LGE on CMR, LVEF~50% of unclear etiology   Imaging and history was reviewed at our Multidisciplinary Sarcoidosis Conference.   FDG-PET shows no evidence of cardiac or extracardiac sarcoidosis.   Based on recent TTE 2/24/21 (read by me), the reduced LVEF reported from gated PET appears to be erroneous and his LVEF remains stable I the 50-55% range.   CMR, while clearly showing a cardiomyopathy with signficant LGE, is not typical of cardiac sarcoidosis. This particular pattern of LGE is somewhat more suggestive of a genetic cardiomyopathy, some of which (e.g., LMNA, Titin) can be associated with ventricular arrhythmias and conduction disease. Genetic counseling referral for genetic testing, which may be of value both for diagnostic purposes and potentially to facilitate cascade screening of first-degree relatives if a pathogenic mutation is identified.   -CV genetic counseling referral for possible genetic cardiomyopathy  -continue EP follow up with Dr. Moreira at [facility]  -follow up in 6 months    Chart review time: 20 minutes  Visit time: 36 minutes  Total time: 56    Sean Garza MD  Cardiology

## 2021-03-07 ENCOUNTER — HEALTH MAINTENANCE LETTER (OUTPATIENT)
Age: 56
End: 2021-03-07

## 2021-03-19 NOTE — TELEPHONE ENCOUNTER
Called patient to schedule. He has declined genetic counseling at this time. Routing to referring providers' RN as GEMMAI.

## 2021-06-20 ENCOUNTER — HEALTH MAINTENANCE LETTER (OUTPATIENT)
Age: 56
End: 2021-06-20

## 2021-07-27 ENCOUNTER — TELEPHONE (OUTPATIENT)
Dept: CARDIOLOGY | Facility: CLINIC | Age: 56
End: 2021-07-27

## 2021-07-27 NOTE — TELEPHONE ENCOUNTER
"Pt needs to reschedule September 14th follow up with Dr. Elias with labs prior.    appt cancelled.    FOLLOW UP REQUEST HAS BEEN CANCELLED; can be found in the \"finalized requests\" tab of the pt's appointment desk. PLEASE REINSTATE REQUEST (right click on request and select \"reinstate\") AND LINK TO APPOINTMENT WHEN SCHEDULING.     "

## 2021-08-10 ENCOUNTER — LAB (OUTPATIENT)
Dept: LAB | Facility: CLINIC | Age: 56
End: 2021-08-10
Payer: COMMERCIAL

## 2021-08-10 ENCOUNTER — OFFICE VISIT (OUTPATIENT)
Dept: CARDIOLOGY | Facility: CLINIC | Age: 56
End: 2021-08-10
Attending: INTERNAL MEDICINE
Payer: COMMERCIAL

## 2021-08-10 VITALS
BODY MASS INDEX: 28.65 KG/M2 | WEIGHT: 211.5 LBS | DIASTOLIC BLOOD PRESSURE: 70 MMHG | HEIGHT: 72 IN | SYSTOLIC BLOOD PRESSURE: 106 MMHG | HEART RATE: 57 BPM | OXYGEN SATURATION: 93 %

## 2021-08-10 DIAGNOSIS — I47.20 VENTRICULAR TACHYCARDIA (H): ICD-10-CM

## 2021-08-10 DIAGNOSIS — I42.8 NICM (NONISCHEMIC CARDIOMYOPATHY) (H): Primary | ICD-10-CM

## 2021-08-10 DIAGNOSIS — D86.85 CARDIAC SARCOIDOSIS: ICD-10-CM

## 2021-08-10 LAB
ANION GAP SERPL CALCULATED.3IONS-SCNC: 6 MMOL/L (ref 3–14)
BUN SERPL-MCNC: 13 MG/DL (ref 7–30)
CALCIUM SERPL-MCNC: 9.5 MG/DL (ref 8.5–10.1)
CHLORIDE BLD-SCNC: 107 MMOL/L (ref 94–109)
CO2 SERPL-SCNC: 25 MMOL/L (ref 20–32)
CREAT SERPL-MCNC: 0.81 MG/DL (ref 0.66–1.25)
GFR SERPL CREATININE-BSD FRML MDRD: >90 ML/MIN/1.73M2
GLUCOSE BLD-MCNC: 106 MG/DL (ref 70–99)
NT-PROBNP SERPL-MCNC: 299 PG/ML (ref 0–125)
POTASSIUM BLD-SCNC: 3.8 MMOL/L (ref 3.4–5.3)
SODIUM SERPL-SCNC: 138 MMOL/L (ref 133–144)

## 2021-08-10 PROCEDURE — 99214 OFFICE O/P EST MOD 30 MIN: CPT | Performed by: INTERNAL MEDICINE

## 2021-08-10 PROCEDURE — 36415 COLL VENOUS BLD VENIPUNCTURE: CPT | Performed by: PATHOLOGY

## 2021-08-10 PROCEDURE — 83880 ASSAY OF NATRIURETIC PEPTIDE: CPT | Performed by: PATHOLOGY

## 2021-08-10 PROCEDURE — G0463 HOSPITAL OUTPT CLINIC VISIT: HCPCS

## 2021-08-10 PROCEDURE — 80048 BASIC METABOLIC PNL TOTAL CA: CPT | Performed by: PATHOLOGY

## 2021-08-10 RX ORDER — SOTALOL HYDROCHLORIDE 80 MG/1
80 TABLET ORAL 2 TIMES DAILY
COMMUNITY
Start: 2021-03-15 | End: 2022-03-15

## 2021-08-10 ASSESSMENT — PAIN SCALES - GENERAL: PAINLEVEL: NO PAIN (0)

## 2021-08-10 ASSESSMENT — MIFFLIN-ST. JEOR: SCORE: 1817.36

## 2021-08-10 NOTE — NURSING NOTE
Chief Complaint   Patient presents with     Follow Up     Return      Vitals were taken and medications where reconciled.   Cayden Mederos, EMT  3:45 PM

## 2021-08-10 NOTE — PATIENT INSTRUCTIONS
"FOLLOW UP  1. Follow up with Dr. Garza in 1 year with echocardiogram prior.     During business hours: [phone], press option # 1 to be routed to the [facility] then option # 4 for \"To send a message to your care team\"      After hours, weekends or holidays: On Call Cardiologist- [phone]   option #4 and ask to speak to the on-call Cardiologist. Inform them you are a CORE/heart failure patient at the [facility].        Regina Armenta RN BSN   Cardiology Nurse Coordinator - Heart Failure/C.O.R.E. Clinic  [facility]  Questions and [phone]     "

## 2021-08-10 NOTE — LETTER
8/10/2021      RE: Brad Bruno  [address] [facility] [address]       Dear Colleague,    Thank you for the opportunity to participate in the care of your patient, Brad Bruno, at the [facility]. Please see a copy of my visit note below.    Chief complaint: wide complex tachycardia     HPI:   Brad Bruno is a 56 year old male with history of DM Type 2, Liver fibrosis, VT s/p ICD 12/2020, NICM who presents for possible cardiac sarcoid evaluation.      presented to the ER on 12/2020 for palpitations and chest pain. EKG showed evidence of VT. He was then transferred to [facility] for further evaluation. Coronary angiogram showed no evidence of coronary artery disease. TTE showed borderline LVEF 50% and inferolateral WMA. EP study was done which showed VT and PVC with varying morphology. Cardiac MRI was done which showed various regions of late gadolinium enhancement suggestive of non ischemic cardiomyopathy and possible sarcoid disease. He was not discharged on anti-arrhythmic agents or beta blockers and was advised outpatient follow-up.     Patient reports that he has no known cardiac condition prior to this. His job requires him to be physically active and he has had no limitations prior to his admission in 12/2020 and after his hospitalization. 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     Interval history 3/2/2021  Brad reports feeling generally well. His only complaint is occasional palpitations (sensation of single skipped heartbeat followed by a strong heartbeat.) No change in exertional capacity.   He denies  any chest pain, dyspnea at rest or with exertion, PND, orthopnea, peripheral edema, lightheadedness or syncope. No reported ICD shocks.    08/10/21  Brad reports walking regularly. No change in exertional  capacity. He reports feeling generally well and has no complaints.     PAST MEDICAL HISTORY:  Liver fibrosis   DM type 2     CURRENT MEDICATIONS:  Current Outpatient Medications   Medication Sig Dispense Refill     blood glucose (ACCU-CHEK GUIDE) test strip USE TO TEST 3 TIMES DAILY.       blood glucose monitoring (ACCU-CHEK FASTCLIX) lancets USE TO TEST BLOOD SUGAR THREE TIMES A DAY       insulin glargine (LANTUS SOLOSTAR) 100 UNIT/ML pen Inject 15 Units Subcutaneous every evening       insulin pen needle (BD RONAL U/F) 32G X 4 MM miscellaneous        sitagliptin (JANUVIA) 50 MG tablet Take 50 mg by mouth daily       sotalol (BETAPACE) 80 MG tablet Take 80 mg by mouth 2 times daily       albuterol (PROAIR HFA/PROVENTIL HFA/VENTOLIN HFA) 108 (90 Base) MCG/ACT inhaler Inhale 2 puffs into the lungs every 4 hours as needed for wheezing (Patient not taking: Reported on 8/10/2021)       fluticasone (FLONASE) 50 MCG/ACT nasal spray PLACE 2 SPRAYS INTO BOTH NOSTRILS D. USE UP TO 1 MONTH (Patient not taking: Reported on 8/10/2021)       ibuprofen (ADVIL/MOTRIN) 600 MG tablet Take 600 mg by mouth every 6 hours as needed for pain (Patient not taking: Reported on 8/10/2021)         PAST SURGICAL HISTORY:  No past surgical history on file.    ALLERGIES:   No Known Allergies    FAMILY HISTORY:  No family history of premature CAD or sudden death.  Dad colon cancer  Mom breast cancer, lymphoma    SOCIAL HISTORY:  Social History     Tobacco Use     Smoking status: Former Smoker     Smokeless tobacco: Never Used   Substance Use Topics     Alcohol use: Not Currently     Drug use: Yes     Types: Marijuana   Medical marijuana for sleep     ROS:   A comprehensive 14 point review of systems is negative other than as mentioned in HPI.    Exam:  /70 (BP Location: Right arm, Patient Position: Chair, Cuff Size: Adult Regular)   Pulse 57   Ht 1.829 m (6')   Wt 95.9 kg (211 lb 8 oz)   SpO2 93%   BMI 28.68 kg/m     GENERAL APPEARANCE:  healthy, alert and no distress  EYES: no icterus, no xanthelasmas  ENT: normal palate, mucosa moist, no central cyanosis  NECK: JVP not elevated  RESPIRATORY: lungs clear to auscultation - no rales, rhonchi or wheezes, no use of accessory muscles, no retractions, respirations are unlabored, normal respiratory rate  CARDIOVASCULAR: regular rhythm, normal S1 with physiologic split S2, no S3 or S4 and no murmur, click or rub.  GI: soft, non tender, bowel sounds normal,no abdominal bruits  EXTREMITIES: no edema, no bruits  NEURO: alert and oriented to person/place/time, normal speech, gait and affect  VASC: Radial, dorsalis pedis and posterior tibialis pulses 2+ bilaterally.  SKIN: no ecchymoses, no rashes.  PSYCH: cooperative, affect appropriate.     Labs:  Reviewed.     Testing/Procedures:    I personally visualized and interpreted:  CMR 12/24/20 ([facility]): Borderine-reduced LV function. Midmyocardial septal LGE, extensive lateral wall LGE, which appears confluent on some views.     NH3/FDG-PET 2/11/21:   No cardiac FDG uptake to suggest active cardiac sarcoidosis.  No extracardiac FDG uptake to suggest systemic/extracardiac sarcoidosis.  No focal perfusion defects.      TTE 2/24/21 (read by me):  Borderline (EF 50-55%) reduced left ventricular function is present. LVEF 53%  based on biplane 2D tracing.  Right ventricular function, chamber size, wall motion, and thickness are  normal.  No significant valvular abnormalities were noted.  Previous study not available for comparison.    Outside records from [facility] were obtained, and relevant results/notes have been incorporated into HPI.    Outside results of note:   ICD transmission 8/10/21:  Routine dual chamber ICD remote transmission complete.  Battery longevity   estimates 11.5 years.  2 NSVT episodes noted lasting 7 to 15 beats.   Durations 2-3 sec, Avg V-rates 200-240 BPM. NO therapies noted.  Heart rate    histograms show good rate variation without  tachy trend. AP 61.9% RV pace   1.0%.  No short interval counts noted.  Stable lead performance with review    of lead trending graphs.     [facility]   12/23 TTE:  Summary  1. Normal LV size with low normal systolic function. EF 50-55%.  Inferolateral wall is hypokinetic.  2. Mildly dilated aortic root at 4.5 cm and ascending aorta at 4 cm.  3. Normal valve function and structure.  4. Normal RV size and function.  5. Mild LA dilatation.  6. No prior studies for comparison.    12/23 Coronary Angiogram:  Conclusions  1. Hx of AFib vs VT, wall motion abnormality on echo.  2. Normal coronary arteries.  3. LVEDP 3mmHg.    12/24 MR Cardiac w/wo contrast:  Conclusions:   1. Mild left ventricular enlargement. Normal left ventricular wall   thickness. The BMI-adjusted left   ventricular mass is normal. Mildly reduced left ventricular systolic   function, LVEF 51%. Basal   inferolateral hypokinesis. Base to mid inferolateral, basal inferior mid   myocardial and basal septal late   gadolinium enhancement noted. Small area of distal inferolateral and mid   anterolateral subendocardial   late gadolinium enhancement. Near transmural basal anterolateral late   gadolinium enhancement. Overall   findings suggestive of non-ischemic cardiomyopathy (consider Sarcoid   Cardiomyopathy).   2. Normal RV size and function.   3. Mild to moderate biatrial enlargement.   4. Dilated aortic root measures 4.2 cm at the level of the Sinus of   Valsalva.     Procedures:   12/23 EPS revealing inducible VT with different morphology from clinical arrhythmia and also pleomorphic PVCs. Patient also with significant AVN conduction disease with AV block at low atrial pacing rates.    12/24 Dual chamber ICD implant:   Conclusions   Successful dual chamber ICDimplant   VF induction with T wave shock --> VF--> VT    (LBB morphology , LAD)--> shock 15 J --> NSR   VF induction with 50 Hz pacing--> VF--->15 J shock-->   NSR     Assessment and  Plan:     1. VT, polymorphic s/p [employer] dual chamber ICD   2. NICM with significant LGE on CMR, LVEF~50% of unclear etiology   Imaging and history was reviewed at our Multidisciplinary Sarcoidosis Conference.   FDG-PET shows no evidence of cardiac or extracardiac sarcoidosis.   Based on recent TTE 2/24/21 (read by me), the reduced LVEF reported from gated PET appears to be erroneous and his LVEF remains stable I the 50-55% range.   CMR, while clearly showing a cardiomyopathy with signficant LGE, is not typical of cardiac sarcoidosis. This particular pattern of LGE is somewhat more suggestive of a genetic cardiomyopathy, some of which (e.g., LMNA, Titin) can be associated with ventricular arrhythmias and conduction disease. Genetic counseling referral for genetic testing, which may be of value both for diagnostic purposes and potentially to facilitate cascade screening of first-degree relatives if a pathogenic mutation is identified.   -offered CV genetic counseling referral for possible genetic cardiomyopathy; patient declines  -continue EP follow up with Dr. Moreira at [facility]  -follow up in 1 year with echocardiogram prior    Chart review time: 15 minutes  Visit time: 22 minutes  Total time: 37 minutes    Sean Garza MD  Cardiology

## 2021-10-11 ENCOUNTER — HEALTH MAINTENANCE LETTER (OUTPATIENT)
Age: 56
End: 2021-10-11

## 2022-01-30 ENCOUNTER — HEALTH MAINTENANCE LETTER (OUTPATIENT)
Age: 57
End: 2022-01-30

## 2022-03-27 ENCOUNTER — HEALTH MAINTENANCE LETTER (OUTPATIENT)
Age: 57
End: 2022-03-27

## 2022-05-22 ENCOUNTER — HEALTH MAINTENANCE LETTER (OUTPATIENT)
Age: 57
End: 2022-05-22

## 2022-05-24 ENCOUNTER — TELEPHONE (OUTPATIENT)
Dept: CARDIOLOGY | Facility: CLINIC | Age: 57
End: 2022-05-24
Payer: COMMERCIAL

## 2022-05-24 DIAGNOSIS — D86.85 CARDIAC SARCOIDOSIS: Primary | ICD-10-CM

## 2022-05-24 NOTE — TELEPHONE ENCOUNTER
Date: 5/24/2022    Time of Call: 6:05 PM     Diagnosis:  VT/Sarcoid     [ VORB ] Ordering provider: Dr SELAM Garza    Order: CMRI and PET scan, follow up with Dr Garza     Order received by: Iliana Rai RN       Follow-up/additional notes: spoke to Brad, updated him on plan and will update him on dates as soon as possible.

## 2022-05-24 NOTE — TELEPHONE ENCOUNTER
I called Brad to discuss his message. He said his defibrillator shocked him twice on Sunday. After this happened, he sat down for a minute and felt fine afterwards. He has not been evaluated since this happened.     He denies any palpitations or chest pain. He does not check his BP at home. He used to follow with [facility] for [facility], but would like to transfer his care to us. He has an upcoming echocardiogram and appointment with Dr. Garza 8/30/2022.    Regina Armenta RN

## 2022-05-24 NOTE — TELEPHONE ENCOUNTER
Health Call Center    Phone Message    May a detailed message be left on voicemail: yes     Reason for Call: Other: Patient has been experiencing defibrillator episodes. He states he had two back to back on Sunday 5/22. Please call patient to discuss further, thank you.    Action Taken: Message routed to:  Other: Cardiology    Travel Screening: Not Applicable

## 2022-05-25 ENCOUNTER — ANCILLARY PROCEDURE (OUTPATIENT)
Dept: CARDIOLOGY | Facility: CLINIC | Age: 57
End: 2022-05-25
Attending: INTERNAL MEDICINE
Payer: COMMERCIAL

## 2022-05-25 DIAGNOSIS — I47.20 VENTRICULAR TACHYCARDIA (H): ICD-10-CM

## 2022-05-25 DIAGNOSIS — I42.8 NICM (NONISCHEMIC CARDIOMYOPATHY) (H): ICD-10-CM

## 2022-05-25 DIAGNOSIS — I42.8 NICM (NONISCHEMIC CARDIOMYOPATHY) (H): Primary | ICD-10-CM

## 2022-05-25 DIAGNOSIS — Z95.810 ICD (IMPLANTABLE CARDIOVERTER-DEFIBRILLATOR) IN PLACE: ICD-10-CM

## 2022-05-25 DIAGNOSIS — D86.85 CARDIAC SARCOIDOSIS: ICD-10-CM

## 2022-05-25 PROCEDURE — 93295 DEV INTERROG REMOTE 1/2/MLT: CPT | Performed by: INTERNAL MEDICINE

## 2022-05-25 PROCEDURE — 93296 REM INTERROG EVL PM/IDS: CPT

## 2022-05-26 ENCOUNTER — MYC MEDICAL ADVICE (OUTPATIENT)
Dept: CARDIOLOGY | Facility: CLINIC | Age: 57
End: 2022-05-26
Payer: COMMERCIAL

## 2022-05-26 NOTE — TELEPHONE ENCOUNTER
PET scan scheduled for 6/3  CMRI scheduled for 6/17  Dr Garza tentatively scheduled for 6/21 with labs prior

## 2022-05-26 NOTE — LETTER
2022      RE: Brad Bruno  [address] [facility] [address]       To Whom it May Concern,    Brad Bruno is a patient under my care at the [facility] [facility].  He is being evaluated for a rare disease called cardiac Sarcoid which requires specialized treatment,  monitoring and follow up. As most practices do not specialize in cardiac sarcoidosis, we request an exception be made for him to continue to follow with us for the management of this rare disease    If you have further questions, please reach out to the clinic.      Sincerely,      Sean Elias MD   of Medicine, Cardiovascular Imaging  Informatics Director, Cardiovascular Service Line  Interim Echocardiography Medical Director [facility]  [facility] Cardiovascular Division    Iliana Rai RN BSN CHFN  Cardiology Care Coordinator - Heart Failure/ C.O.R.E. Clinic  [facility]   Questions and [phone]   First press #1 for the [place] to leave a message for your care team and to schedule

## 2022-05-31 LAB
MDC_IDC_LEAD_IMPLANT_DT: NORMAL
MDC_IDC_LEAD_IMPLANT_DT: NORMAL
MDC_IDC_LEAD_LOCATION: NORMAL
MDC_IDC_LEAD_LOCATION: NORMAL
MDC_IDC_LEAD_LOCATION_DETAIL_1: NORMAL
MDC_IDC_LEAD_LOCATION_DETAIL_1: NORMAL
MDC_IDC_LEAD_MFG: NORMAL
MDC_IDC_LEAD_MFG: NORMAL
MDC_IDC_LEAD_MODEL: NORMAL
MDC_IDC_LEAD_MODEL: NORMAL
MDC_IDC_LEAD_POLARITY_TYPE: NORMAL
MDC_IDC_LEAD_POLARITY_TYPE: NORMAL
MDC_IDC_LEAD_SERIAL: NORMAL
MDC_IDC_LEAD_SERIAL: NORMAL
MDC_IDC_LEAD_SPECIAL_FUNCTION: NORMAL
MDC_IDC_LEAD_SPECIAL_FUNCTION: NORMAL
MDC_IDC_MSMT_BATTERY_DTM: NORMAL
MDC_IDC_MSMT_BATTERY_REMAINING_LONGEVITY: 121 MO
MDC_IDC_MSMT_BATTERY_RRT_TRIGGER: NORMAL
MDC_IDC_MSMT_BATTERY_VOLTAGE: 2.97 V
MDC_IDC_MSMT_CAP_CHARGE_DTM: NORMAL
MDC_IDC_MSMT_CAP_CHARGE_ENERGY: 40 J
MDC_IDC_MSMT_CAP_CHARGE_TIME: 9.7 S
MDC_IDC_MSMT_CAP_CHARGE_TYPE: NORMAL
MDC_IDC_MSMT_LEADCHNL_RA_IMPEDANCE_VALUE: 627 OHM
MDC_IDC_MSMT_LEADCHNL_RA_PACING_THRESHOLD_AMPLITUDE: 1.5 V
MDC_IDC_MSMT_LEADCHNL_RA_PACING_THRESHOLD_PULSEWIDTH: 0.4 MS
MDC_IDC_MSMT_LEADCHNL_RA_SENSING_INTR_AMPL: 0.6 MV
MDC_IDC_MSMT_LEADCHNL_RV_IMPEDANCE_VALUE: 304 OHM
MDC_IDC_MSMT_LEADCHNL_RV_IMPEDANCE_VALUE: 399 OHM
MDC_IDC_MSMT_LEADCHNL_RV_PACING_THRESHOLD_AMPLITUDE: 0.75 V
MDC_IDC_MSMT_LEADCHNL_RV_PACING_THRESHOLD_PULSEWIDTH: 0.4 MS
MDC_IDC_MSMT_LEADCHNL_RV_SENSING_INTR_AMPL: 8.6 MV
MDC_IDC_PG_IMPLANT_DTM: NORMAL
MDC_IDC_PG_MFG: NORMAL
MDC_IDC_PG_MODEL: NORMAL
MDC_IDC_PG_SERIAL: NORMAL
MDC_IDC_PG_TYPE: NORMAL
MDC_IDC_SESS_CLINIC_NAME: NORMAL
MDC_IDC_SESS_DTM: NORMAL
MDC_IDC_SESS_TYPE: NORMAL
MDC_IDC_SET_BRADY_AT_MODE_SWITCH_RATE: 171 {BEATS}/MIN
MDC_IDC_SET_BRADY_LOWRATE: 50 {BEATS}/MIN
MDC_IDC_SET_BRADY_MAX_SENSOR_RATE: 130 {BEATS}/MIN
MDC_IDC_SET_BRADY_MAX_TRACKING_RATE: 130 {BEATS}/MIN
MDC_IDC_SET_BRADY_MODE: NORMAL
MDC_IDC_SET_BRADY_PAV_DELAY_LOW: 180 MS
MDC_IDC_SET_BRADY_SAV_DELAY_LOW: 150 MS
MDC_IDC_SET_LEADCHNL_RA_PACING_AMPLITUDE: 2.25 V
MDC_IDC_SET_LEADCHNL_RA_PACING_ANODE_ELECTRODE_1: NORMAL
MDC_IDC_SET_LEADCHNL_RA_PACING_ANODE_LOCATION_1: NORMAL
MDC_IDC_SET_LEADCHNL_RA_PACING_CAPTURE_MODE: NORMAL
MDC_IDC_SET_LEADCHNL_RA_PACING_CATHODE_ELECTRODE_1: NORMAL
MDC_IDC_SET_LEADCHNL_RA_PACING_CATHODE_LOCATION_1: NORMAL
MDC_IDC_SET_LEADCHNL_RA_PACING_POLARITY: NORMAL
MDC_IDC_SET_LEADCHNL_RA_PACING_PULSEWIDTH: 0.4 MS
MDC_IDC_SET_LEADCHNL_RA_SENSING_ANODE_ELECTRODE_1: NORMAL
MDC_IDC_SET_LEADCHNL_RA_SENSING_ANODE_LOCATION_1: NORMAL
MDC_IDC_SET_LEADCHNL_RA_SENSING_CATHODE_ELECTRODE_1: NORMAL
MDC_IDC_SET_LEADCHNL_RA_SENSING_CATHODE_LOCATION_1: NORMAL
MDC_IDC_SET_LEADCHNL_RA_SENSING_POLARITY: NORMAL
MDC_IDC_SET_LEADCHNL_RA_SENSING_SENSITIVITY: 0.3 MV
MDC_IDC_SET_LEADCHNL_RV_PACING_AMPLITUDE: 2 V
MDC_IDC_SET_LEADCHNL_RV_PACING_ANODE_ELECTRODE_1: NORMAL
MDC_IDC_SET_LEADCHNL_RV_PACING_ANODE_LOCATION_1: NORMAL
MDC_IDC_SET_LEADCHNL_RV_PACING_CAPTURE_MODE: NORMAL
MDC_IDC_SET_LEADCHNL_RV_PACING_CATHODE_ELECTRODE_1: NORMAL
MDC_IDC_SET_LEADCHNL_RV_PACING_CATHODE_LOCATION_1: NORMAL
MDC_IDC_SET_LEADCHNL_RV_PACING_POLARITY: NORMAL
MDC_IDC_SET_LEADCHNL_RV_PACING_PULSEWIDTH: 0.4 MS
MDC_IDC_SET_LEADCHNL_RV_SENSING_ANODE_ELECTRODE_1: NORMAL
MDC_IDC_SET_LEADCHNL_RV_SENSING_ANODE_LOCATION_1: NORMAL
MDC_IDC_SET_LEADCHNL_RV_SENSING_CATHODE_ELECTRODE_1: NORMAL
MDC_IDC_SET_LEADCHNL_RV_SENSING_CATHODE_LOCATION_1: NORMAL
MDC_IDC_SET_LEADCHNL_RV_SENSING_POLARITY: NORMAL
MDC_IDC_SET_LEADCHNL_RV_SENSING_SENSITIVITY: 0.3 MV
MDC_IDC_SET_ZONE_DETECTION_BEATS_DENOMINATOR: 40 {BEATS}
MDC_IDC_SET_ZONE_DETECTION_BEATS_NUMERATOR: 30 {BEATS}
MDC_IDC_SET_ZONE_DETECTION_INTERVAL: 300 MS
MDC_IDC_SET_ZONE_DETECTION_INTERVAL: 350 MS
MDC_IDC_SET_ZONE_DETECTION_INTERVAL: 360 MS
MDC_IDC_SET_ZONE_DETECTION_INTERVAL: 400 MS
MDC_IDC_SET_ZONE_TYPE: NORMAL
MDC_IDC_STAT_AT_BURDEN_PERCENT: 0 %
MDC_IDC_STAT_AT_DTM_END: NORMAL
MDC_IDC_STAT_AT_DTM_START: NORMAL
MDC_IDC_STAT_BRADY_DTM_END: NORMAL
MDC_IDC_STAT_BRADY_DTM_START: NORMAL
MDC_IDC_STAT_BRADY_RA_PERCENT_PACED: 74.4 %
MDC_IDC_STAT_BRADY_RV_PERCENT_PACED: 1.53 %
MDC_IDC_STAT_CRT_DTM_END: NORMAL
MDC_IDC_STAT_CRT_DTM_START: NORMAL
MDC_IDC_STAT_EPISODE_RECENT_COUNT: 0
MDC_IDC_STAT_EPISODE_RECENT_COUNT_DTM_END: NORMAL
MDC_IDC_STAT_EPISODE_RECENT_COUNT_DTM_START: NORMAL
MDC_IDC_STAT_EPISODE_TOTAL_COUNT: 0
MDC_IDC_STAT_EPISODE_TOTAL_COUNT: 1
MDC_IDC_STAT_EPISODE_TOTAL_COUNT: 1
MDC_IDC_STAT_EPISODE_TOTAL_COUNT: 110
MDC_IDC_STAT_EPISODE_TOTAL_COUNT: 5
MDC_IDC_STAT_EPISODE_TOTAL_COUNT: 9
MDC_IDC_STAT_EPISODE_TOTAL_COUNT_DTM_END: NORMAL
MDC_IDC_STAT_EPISODE_TOTAL_COUNT_DTM_START: NORMAL
MDC_IDC_STAT_EPISODE_TYPE: NORMAL
MDC_IDC_STAT_TACHYTHERAPY_ATP_DELIVERED_RECENT: 0
MDC_IDC_STAT_TACHYTHERAPY_ATP_DELIVERED_TOTAL: 1
MDC_IDC_STAT_TACHYTHERAPY_RECENT_DTM_END: NORMAL
MDC_IDC_STAT_TACHYTHERAPY_RECENT_DTM_START: NORMAL
MDC_IDC_STAT_TACHYTHERAPY_SHOCKS_ABORTED_RECENT: 0
MDC_IDC_STAT_TACHYTHERAPY_SHOCKS_ABORTED_TOTAL: 1
MDC_IDC_STAT_TACHYTHERAPY_TOTAL_DTM_END: NORMAL
MDC_IDC_STAT_TACHYTHERAPY_TOTAL_DTM_START: NORMAL

## 2022-06-14 ENCOUNTER — TELEPHONE (OUTPATIENT)
Dept: CARDIOLOGY | Facility: CLINIC | Age: 57
End: 2022-06-14
Payer: COMMERCIAL

## 2022-06-14 NOTE — TELEPHONE ENCOUNTER
[facility] Call Center    Phone Message    May a detailed message be left on voicemail: yes     Reason for Call: Other: Kitty would like a call back as she can not find the correct Tax ID number with the form that was sent in for pt , Please reach out to discuss     Action Taken: Message routed to:  [facility] Cardio    Travel Screening: Not Applicable

## 2022-06-14 NOTE — TELEPHONE ENCOUNTER
Called Kitty back with updated TIN (she confirmed this is correct).  We will send in a application for the clinic and the hospital (for CMRI needs) for in-network approval.  Approval or denial will be available by Thursday morning.

## 2022-06-16 DIAGNOSIS — I47.20 VENTRICULAR TACHYCARDIA (H): ICD-10-CM

## 2022-06-16 DIAGNOSIS — Z95.810 ICD (IMPLANTABLE CARDIOVERTER-DEFIBRILLATOR) IN PLACE: Primary | ICD-10-CM

## 2022-06-16 NOTE — TELEPHONE ENCOUNTER
Received fax from [employer]'s insurance allowing for 3 visits with Dr Garza in the next 6 months.  Will have patient keep visit with Dr Garza next week and discuss need for imaging.

## 2022-06-21 ENCOUNTER — OFFICE VISIT (OUTPATIENT)
Dept: CARDIOLOGY | Facility: CLINIC | Age: 57
End: 2022-06-21
Attending: INTERNAL MEDICINE
Payer: COMMERCIAL

## 2022-06-21 VITALS
WEIGHT: 202.2 LBS | HEIGHT: 73 IN | HEART RATE: 56 BPM | BODY MASS INDEX: 26.8 KG/M2 | SYSTOLIC BLOOD PRESSURE: 124 MMHG | OXYGEN SATURATION: 95 % | DIASTOLIC BLOOD PRESSURE: 87 MMHG

## 2022-06-21 DIAGNOSIS — I47.20 SUSTAINED VT (VENTRICULAR TACHYCARDIA) (H): Primary | ICD-10-CM

## 2022-06-21 DIAGNOSIS — Z95.810 ICD (IMPLANTABLE CARDIOVERTER-DEFIBRILLATOR) IN PLACE: ICD-10-CM

## 2022-06-21 DIAGNOSIS — I42.8 NICM (NONISCHEMIC CARDIOMYOPATHY) (H): ICD-10-CM

## 2022-06-21 DIAGNOSIS — D86.85 CARDIAC SARCOIDOSIS: ICD-10-CM

## 2022-06-21 DIAGNOSIS — I50.22 CHRONIC SYSTOLIC HEART FAILURE (H): ICD-10-CM

## 2022-06-21 PROCEDURE — 99215 OFFICE O/P EST HI 40 MIN: CPT | Performed by: INTERNAL MEDICINE

## 2022-06-21 PROCEDURE — 99417 PROLNG OP E/M EACH 15 MIN: CPT | Performed by: INTERNAL MEDICINE

## 2022-06-21 PROCEDURE — G0463 HOSPITAL OUTPT CLINIC VISIT: HCPCS

## 2022-06-21 RX ORDER — SOTALOL HYDROCHLORIDE 80 MG/1
1 TABLET ORAL 2 TIMES DAILY
COMMUNITY
Start: 2022-03-21

## 2022-06-21 RX ORDER — METOPROLOL SUCCINATE 25 MG/1
25 TABLET, EXTENDED RELEASE ORAL DAILY
COMMUNITY
Start: 2022-04-22 | End: 2023-04-22

## 2022-06-21 ASSESSMENT — PAIN SCALES - GENERAL: PAINLEVEL: NO PAIN (0)

## 2022-06-21 NOTE — PATIENT INSTRUCTIONS
"Cardiology Providers you saw during your visit:  Dr. Garza    Medication changes:  None.    Follow up:  We will schedule cardiac MRI and PET scan. This needs to be done within 1 month.  Follow up TBD based on testing.  We will discuss your case at sarcoid meeting.    Labs:  None.    Please call if you have :  1. Weight gain of more than 2 pounds in a day or 5 pounds in a week  2. Increased shortness of breath, swelling or bloating  3. Dizziness, lightheadedness   4. Any questions or concerns.       Follow the American Heart Association Diet and Lifestyle recommendations:  Limit saturated fat, trans fat, sodium, red meat, sweets and sugar-sweetened beverages. If you choose to eat red meat, compare labels and select the leanest cuts available.  Aim for at least 150 minutes of moderate physical activity or 75 minutes of vigorous physical activity - or an equal combination of both - each week.      During business hours: [phone], press option # 1 to schedule or leave a message for your care team      After hours, weekends or holidays: On Call Cardiologist- [phone]   option #4 and ask to speak to the on-call Cardiologist. Inform them you are a CORE/heart failure patient at the [facility].      Iliana Rai RN BSN CHFN  Cardiology Care Coordinator - Heart Failure/ C.O.R.E. Clinic  [facility]   Questions and [phone]   First press #1 for the [place] and then press #4 for \"To send a message to your care team\"    "

## 2022-06-21 NOTE — NURSING NOTE
Chief Complaint   Patient presents with     Follow Up     Greg Sarcoid labs prior, PET and CMRI earlier in the month          Vitals were taken and medications reconciled.     Gerard Ruvalcaba, EMT   12:18 PM

## 2022-06-21 NOTE — PROGRESS NOTES
Chief complaint: Recurrent ventricular tachycardia, possible cardiac sarcoidosis    HPI:   Brad Bruno is a 57 year old male with history of DM Type 2, Liver fibrosis, VT s/p ICD 12/2020, chronic systolic HF due to NICM of unclear etiology who presents for reassessment for possible cardiac sarcoidosis after recurrent VT.    Cardiac history:   presented to the ER on 12/2020 for palpitations and chest pain. EKG showed evidence of VT. He was then transferred to [facility] for further evaluation. Coronary angiogram showed no evidence of coronary artery disease. TTE showed borderline LVEF 50% and inferolateral WMA. EP study was done which showed VT and PVC with varying morphology; some evidence of AV don disease was also identified. Cardiac MRI was done which showed various regions of late gadolinium enhancement suggestive of non ischemic cardiomyopathy; cardiac sarcoidosis was raised as a possibility. He was not discharged on anti-arrhythmic agents or beta blockers and was advised outpatient follow-up.     He was referred to me by Dr. Shoaib Moreira for possible cardiac sarcoidosis 1/19/21. At that time, he reported that he had no known cardiac condition prior to his VT episode. His job requires him to be physically active and he had no limitations prior to his admission in 12/2020 or after his hospitalization. He was still working (delivering autoparts) and was able to walk several miles a day without any issues.     His data were reviewed at the 2/26/21 Multidisciplinary Cardiac Sarcoidosis Conference. CMR findings, while not entirely specific, were felt somewhat more consistent with a genetic cardiomyopathy than with cardiac sarcoidosis; FDG-PET 2/11/21 showed no active cardiac or extracardiac inflammation to suggest active cardiac or systemic sarcoidosis. No extracardiac sites for tissue diagnosis were identified. Recommendation at that time was to recommend genetic counseling referral and not to  pursue further workup for cardiac sarcoidosis in the absence of new/recurrent symptoms to raise suspicion for this. Genetic counseling was offered but declined by the patient.    He continued to feel well, with stable exertional capacity and no symptoms other than occasional palpitations at follow up visits on 3/2/21 and 8/10/21.     06/21/22:  On 5/22/22, he had 2 ICD shocks. Device transmission on 5/24/22 showed an episode of atrial flutter lasting 1 minute 49 seconds followed by 1 episode of VT (rate 222 bpm, treated with burst ATP pacing (unsuccessful) followed by 1 ICD shock which was unsuccessful and then 2nd ICD shock which was successful; he had 4 subsequent episodes of nonsustained VT. Prior ICD interrogaiton 4/19/22 had show 9 episodes of sustained VT (longest 6 min 18 sec on 1/15/22) and 90 episodes of nonsustained VT. His Prior ICD interrogation from 6/15/21 had shown only 2 episodes of nonsustained VT lasting 7 and 15 beats. Of note, he was out of town and had not taken his Toprol XL and sotalol on the date of his ICD shocks.    I was made aware of these findings and discussed them with Dr. Moreira. We both had concerns that his cardiomyopathy may have represented a less typical phenotype of cardiac sarcoidosis and if this were the case, that an increase in VT burden could represent recurrent granulomatous inflammation after previously-quiescent (based on 2/2021 PET) disease (vs. recurrence of scar-mediated VT from quiescent cardiac sarcoidosis or a non-sarcoidosis cause of NICM.) Plan had been for follow up with me and repeat CMR and FDG-PET prior to reassess LGE phenotype and LVEF and assess for recurrent cardiac or extracardiac inflammation to suggest active sarcoidosis. Unfortunately, as of the time of this visit neither of these procedures has been performed due to reimbursement issues.      Since his ICD shocks on 5/22/22, he reports feeling generally well. His exertional capacity is stable and  "he denies dyspnea. He denies  any chest pain, dyspnea at rest or with exertion, PND, orthopnea, peripheral edema, lightheadedness or syncope.       ICD interrogation 5/24/22 ([facility]):  Dual chamber ICD remote evaluation completed to assess pt getting 2 ICD shocks a couple days ago. Please refer to telephone encounter, pt sent the remote manually when he got home from work yesterday.    Since April 18, 2022, there had been one treated VF detection, 4 NSVT and one AT/AF detection.  All detections were from May 22, 2022 between 16:42-16:44PM.    ATAF detection show was the first detection and shows atrial flutter lasting about one minute, 49 seconds, however, there are NSVT at this time as well, appears to be in sinus tach with runs of NSVT and VF detection was at 16:43 with ventricular rates at 222bpm .  Burst during charge had no affect on the rhythm and ICD shock was delivered with continued V>A rates, 2nd shock delivered converting to sinus with some PVC's. \"VF\" EGM's show polymorAverage V>A rates were 222bpm.  One NSVT following this event showing brief V>A rates.    ICD interrogation 4/19/22 ([facility]):  Routine dual chamber ICD remote transmission complete.  Battery longevity estimates 10.6 years.    9 VT, 90 NSVT and 1 SVT episodes detected.  VHR episodes lasted up to 6 minutes 18 seconds on 1/15/22 with average Vrates 164-214bpm.  No therapies.   Heart rate histograms show good rate variation without tachy trend.  AP 48.6% RV pace 0.6%.  1 short interval count noted.  Stable lead performance with review of lead trending graphs.      Sarcoid history:  Initial presentation:  VT 12/2020  Organs involved: ?heart  Cardiac sarcoidosis? Previous felt to be non-sarcoid cardiac abnormality (2/26/21 Multidisciplinary Cardiac Sarcoidosis Conference)  Tissue diagnosis:  no  Device:   ICD 12/2020   Arrhythmias:   VT 12/2020; recurrent VT 4/2022 and 5/2022  CMR:    12/24/20 ([facility])  PET:    2/11/21  LVEF: "    LVEF~50% CMR 12/24/20   Immunosuppresion:  None      PAST MEDICAL HISTORY:  VT   NICM  Liver fibrosis   DM type 2     CURRENT MEDICATIONS:  Current Outpatient Medications   Medication Sig Dispense Refill     albuterol (PROAIR HFA/PROVENTIL HFA/VENTOLIN HFA) 108 (90 Base) MCG/ACT inhaler Inhale 2 puffs into the lungs every 4 hours as needed for wheezing (Patient not taking: Reported on 8/10/2021)       blood glucose (ACCU-CHEK GUIDE) test strip USE TO TEST 3 TIMES DAILY.       blood glucose monitoring (ACCU-CHEK FASTCLIX) lancets USE TO TEST BLOOD SUGAR THREE TIMES A DAY       fluticasone (FLONASE) 50 MCG/ACT nasal spray PLACE 2 SPRAYS INTO BOTH NOSTRILS D. USE UP TO 1 MONTH (Patient not taking: Reported on 8/10/2021)       ibuprofen (ADVIL/MOTRIN) 600 MG tablet Take 600 mg by mouth every 6 hours as needed for pain (Patient not taking: Reported on 8/10/2021)       insulin glargine (LANTUS SOLOSTAR) 100 UNIT/ML pen Inject 15 Units Subcutaneous every evening       insulin pen needle (BD RONAL U/F) 32G X 4 MM miscellaneous        sitagliptin (JANUVIA) 50 MG tablet Take 50 mg by mouth daily         PAST SURGICAL HISTORY:  No past surgical history on file.    ALLERGIES:   No Known Allergies    FAMILY HISTORY:  No family history of premature CAD or sudden death.  Dad colon cancer  Mom breast cancer, lymphoma    SOCIAL HISTORY:  Social History     Tobacco Use     Smoking status: Former Smoker     Smokeless tobacco: Never Used   Substance Use Topics     Alcohol use: Not Currently     Drug use: Yes     Types: Marijuana   Medical marijuana for sleep     ROS:   A comprehensive 14 point review of systems is negative other than as mentioned in HPI.    Exam:  There were no vitals taken for this visit.   GENERAL APPEARANCE: healthy, alert and no distress  EYES: no icterus, no xanthelasmas  ENT: normal palate, mucosa moist, no central cyanosis  NECK: JVP not elevated  RESPIRATORY: lungs clear to auscultation - no rales, rhonchi or  wheezes, no use of accessory muscles, no retractions, respirations are unlabored, normal respiratory rate  CARDIOVASCULAR: regular rhythm, normal S1 with physiologic split S2, no S3 or S4 and no murmur, click or rub.  GI: soft, non tender, bowel sounds normal,no abdominal bruits  EXTREMITIES: no edema, no bruits  NEURO: alert and oriented to person/place/time, normal speech, gait and affect  VASC: Radial, dorsalis pedis and posterior tibialis pulses 2+ bilaterally.  SKIN: no ecchymoses, no rashes.  PSYCH: cooperative, affect appropriate.     Labs:  Reviewed.     Testing/Procedures:  CMR 12/24/20 ([facility], my read): Borderine-reduced LV function. Midmyocardial septal LGE, extensive lateral wall LGE, which appears confluent on some views.     NH3/FDG-PET 2/11/21 (reviewed by me):   No cardiac FDG uptake to suggest active cardiac sarcoidosis.  No extracardiac FDG uptake to suggest systemic/extracardiac sarcoidosis.  No focal perfusion defects.      TTE 2/24/21 (read by me):  Borderline (EF 50-55%) reduced left ventricular function is present. LVEF 53%  based on biplane 2D tracing.  Right ventricular function, chamber size, wall motion, and thickness are  normal.  No significant valvular abnormalities were noted.  Previous study not available for comparison.    Outside records from [facility] were obtained, and relevant results/notes have been incorporated into HPI.    Outside results of note:   ICD interrogation 5/24/22 ([facility]):  Dual chamber ICD remote evaluation completed to assess pt getting 2 ICD shocks a couple days ago. Please refer to telephone encounter, pt sent the remote manually when he got home from work yesterday.    Since April 18, 2022, there had been one treated VF detection, 4 NSVT and one AT/AF detection.  All detections were from May 22, 2022 between 16:42-16:44PM.    ATAF detection show was the first detection and shows atrial flutter lasting about one minute, 49 seconds, however,  "there are NSVT at this time as well, appears to be in sinus tach with runs of NSVT and VF detection was at 16:43 with ventricular rates at 222bpm .  Burst during charge had no affect on the rhythm and ICD shock was delivered with continued V>A rates, 2nd shock delivered converting to sinus with some PVC's. \"VF\" EGM's show polymorAverage V>A rates were 222bpm.  One NSVT following this event showing brief V>A rates.    ICD interrogation 4/19/22 ([facility]):  Routine dual chamber ICD remote transmission complete.  Battery longevity estimates 10.6 years.    9 VT, 90 NSVT and 1 SVT episodes detected.  VHR episodes lasted up to 6 minutes 18 seconds on 1/15/22 with average Vrates 164-214bpm.  No therapies.   Heart rate histograms show good rate variation without tachy trend.  AP 48.6% RV pace 0.6%.  1 short interval count noted.  Stable lead performance with review of lead trending graphs.      ICD transmission 8/10/21:  Routine dual chamber ICD remote transmission complete.  Battery longevity   estimates 11.5 years.  2 NSVT episodes noted lasting 7 to 15 beats.   Durations 2-3 sec, Avg V-rates 200-240 BPM. NO therapies noted.  Heart rate    histograms show good rate variation without tachy trend. AP 61.9% RV pace   1.0%.  No short interval counts noted.  Stable lead performance with review    of lead trending graphs.       Procedures:   12/23 EPS revealing inducible VT with different morphology from clinical arrhythmia and also pleomorphic PVCs. Patient also with significant AVN conduction disease with AV block at low atrial pacing rates.    12/24 Dual chamber ICD implant:   Conclusions   Successful dual chamber ICDimplant   VF induction with T wave shock --> VF--> VT    (LBB morphology , LAD)--> shock 15 J --> NSR   VF induction with 50 Hz pacing--> VF--->15 J shock-->   NSR     Assessment and Plan:     1. VT, polymorphic s/p [employer] dual chamber ICD, now recurrent and worsening on 4/2022 and 5/2022 ICD " transmissions  2. Appropriate ICD shock for sustained VT 5/2022  3. Possible cardiac sarcoidosis, with concern for reactivation (versus recurrent scar-mediated VT in quiescent isolated cardiac sarcoidosis or non-sarcoidosis NICM)   4. Chronic systolic HF due to NICM of unclear etiology, LVEF~50% CMR 12/2020    Brad has nonischemic cardiomyopathy with significant late gadolinium enhancement on cardiac MRI previously felt to represent a possibly genetic cardiomyopathy.  The etiology of this cardiomyopathy was never fully identified and now with progressively worsening ventricular tachycardia burden including appropriate ICD shock 5/22/22, the possibility that this cardiomyopathy represented an atypical phenotype of previously quiescent isolated cardiac sarcoidosis must be entertained.     The next appropriate steps would be to proceed with advanced imaging. Cardiac MRI will allow for reassessment of LVEF, identification of scar/inflammation on late gadolinium enhancement (in comparison to the prior study) which would suggest new or worsening substrate for ventricular arrhythmias and provided imaging phenotype to aid in diagnosis. FDG-PET will allow for identification of new active inflammation both within the heart (I.e, active cardiac sarcoidosis) and in extracardiac sites, which would both support the diagnosis of cardiac sarcoidosis and allow for definitive histologic diagnosis if present.    Given the lack of clarity in the past regarding the precise etiology of cardiomyopathy, both of these imaging modalities will try complementary data in identifying the precise etiology of VT and determining whether increasing VT burden is related to scar-mediated VT (either in quiescent cardiac sarcoidosis or a non-sarcoidosis NICM) or related to active myocardial inflammation in cardiac sarcoidosis.    This has very clear treatment and prognostic indications with regard to initiation of immunosuppression, heart failure  therapy, and management of VT and the combination of repeat CMR and FDG-PET will provide this information, which would not be obtainable from either modality alone or from other imaging modalities (I.e., TTE or MPI.) If increased VT burden is related to inflammation from previously-undiagnosed and newly-reactivated cardiac sarcoidosis, there is a level of time sensitivity to identification of this within a window during which it will be amenable to immunosuppression.     Plan for CMR and FDG-PET as soon as these can possibly be done (reimbursement has posed a challenge as of the time of this visit.)  It would be strongly preferable that both of these tests to be done at [facility] to ensure as definitive a diagnosis as possible and minimize delays in treatment due to diagnostic uncertainty. This is particularly true given his somewhat undifferentiated imaging phenotype (which CMR using our specific protocols would be more likely to accurately characterize to direct treatment) and especially as our cardiac sarcoidosis-focused dietary preparation for PET has been shown to reduce the likelihood of non-diagnostic or false-positive FDG-PET for cardiac sarcoidosis. (Yousif et al. Journal of Nuclear Medicine Mar 2021, [id number]; DOI: 10.2967/[id number])     Brad's case will be discussed at Multidisciplinary Cardiac Sarcoidosis Conference (by which time new imaging data will hopefully be available) with further recommendations to follow after that time.     Chart review time: 47 minutes  Visit time: 21 minutes  Total time: 69 minutes    Sean Garza MD  Cardiology    ADDENDUM 08/18/22:   CMR 7/14/22 (images reviewed by me) does not show significant change from prior CMR 12/2020. Specifically, mid-myocardial LGE (in a pattern atypical for sarcoidosis and somewhat more suggestive of a genetic cardimyopathy) is unchanged and mildly reduced LV function (LVEF=49%) and normal RV function (RVEF=57%) are both  also unchanged.      FDG-PET 8/12/22 (images reviewed by me) shows no myocardial inflammation to suggest active cardiac sarcoidosis. There is likewise no evidence of extracardiac inflammation to suggest systemic/pulmonary sarcoidosis.      Based on these findings, no further workup for cardiac sarcoidosis is indicated at this time. [facility] Moreira, who has provided excellent care for his VT.      I have discussed a genetic counseling referral for consideration of genetic testing (cardiomyopathy/arrhythmia panel) given his history of VT and imaging phenotype suggestive of a genetic cardiomyopathy and he declined. It may be worthwhile to revisit this in the future if he is amenable to it.    We will reach out to Brad to communicate these results and this plan to him.     Sean Garza MD  Cardiology

## 2022-06-21 NOTE — LETTER
6/21/2022      RE: Brad Bruno  [address] [facility] [address]       Dear Colleague,    Thank you for the opportunity to participate in the care of your patient, Brad Bruno, at the [facility]. Please see a copy of my visit note below.    Chief complaint: Recurrent ventricular tachycardia, possible cardiac sarcoidosis    HPI:   Brad Bruno is a 57 year old male with history of DM Type 2, Liver fibrosis, VT s/p ICD 12/2020, chronic systolic HF due to NICM of unclear etiology who presents for reassessment for possible cardiac sarcoidosis after recurrent VT.    Cardiac history:   presented to the ER on 12/2020 for palpitations and chest pain. EKG showed evidence of VT. He was then transferred to [facility] for further evaluation. Coronary angiogram showed no evidence of coronary artery disease. TTE showed borderline LVEF 50% and inferolateral WMA. EP study was done which showed VT and PVC with varying morphology; some evidence of AV don disease was also identified. Cardiac MRI was done which showed various regions of late gadolinium enhancement suggestive of non ischemic cardiomyopathy; cardiac sarcoidosis was raised as a possibility. He was not discharged on anti-arrhythmic agents or beta blockers and was advised outpatient follow-up.     He was referred to me by Dr. Shoaib Moreira for possible cardiac sarcoidosis 1/19/21. At that time, he reported that he had no known cardiac condition prior to his VT episode. His job requires him to be physically active and he had no limitations prior to his admission in 12/2020 or after his hospitalization. He was still working (delivering autoparts) and was able to walk several miles a day without any issues.     His data were reviewed at the 2/26/21 Multidisciplinary Cardiac Sarcoidosis Conference. CMR findings, while not entirely specific, were felt  somewhat more consistent with a genetic cardiomyopathy than with cardiac sarcoidosis; FDG-PET 2/11/21 showed no active cardiac or extracardiac inflammation to suggest active cardiac or systemic sarcoidosis. No extracardiac sites for tissue diagnosis were identified. Recommendation at that time was to recommend genetic counseling referral and not to pursue further workup for cardiac sarcoidosis in the absence of new/recurrent symptoms to raise suspicion for this. Genetic counseling was offered but declined by the patient.    He continued to feel well, with stable exertional capacity and no symptoms other than occasional palpitations at follow up visits on 3/2/21 and 8/10/21.     06/21/22:  On 5/22/22, he had 2 ICD shocks. Device transmission on 5/24/22 showed an episode of atrial flutter lasting 1 minute 49 seconds followed by 1 episode of VT (rate 222 bpm, treated with burst ATP pacing (unsuccessful) followed by 1 ICD shock which was unsuccessful and then 2nd ICD shock which was successful; he had 4 subsequent episodes of nonsustained VT. Prior ICD interrogaiton 4/19/22 had show 9 episodes of sustained VT (longest 6 min 18 sec on 1/15/22) and 90 episodes of nonsustained VT. His Prior ICD interrogation from 6/15/21 had shown only 2 episodes of nonsustained VT lasting 7 and 15 beats. Of note, he was out of town and had not taken his Toprol XL and sotalol on the date of his ICD shocks.    I was made aware of these findings and discussed them with Dr. Moreira. We both had concerns that his cardiomyopathy may have represented a less typical phenotype of cardiac sarcoidosis and if this were the case, that an increase in VT burden could represent recurrent granulomatous inflammation after previously-quiescent (based on 2/2021 PET) disease (vs. recurrence of scar-mediated VT from quiescent cardiac sarcoidosis or a non-sarcoidosis cause of NICM.) Plan had been for follow up with me and repeat CMR and FDG-PET prior to  "reassess LGE phenotype and LVEF and assess for recurrent cardiac or extracardiac inflammation to suggest active sarcoidosis. Unfortunately, as of the time of this visit neither of these procedures has been performed due to reimbursement issues.      Since his ICD shocks on 5/22/22, he reports feeling generally well. His exertional capacity is stable and he denies dyspnea. He denies  any chest pain, dyspnea at rest or with exertion, PND, orthopnea, peripheral edema, lightheadedness or syncope.       ICD interrogation 5/24/22 ([employer] [employer]):  Dual chamber ICD remote evaluation completed to assess pt getting 2 ICD shocks a couple days ago. Please refer to telephone encounter, pt sent the remote manually when he got home from work yesterday.    Since April 18, 2022, there had been one treated VF detection, 4 NSVT and one AT/AF detection.  All detections were from May 22, 2022 between 16:42-16:44PM.    ATAF detection show was the first detection and shows atrial flutter lasting about one minute, 49 seconds, however, there are NSVT at this time as well, appears to be in sinus tach with runs of NSVT and VF detection was at 16:43 with ventricular rates at 222bpm .  Burst during charge had no affect on the rhythm and ICD shock was delivered with continued V>A rates, 2nd shock delivered converting to sinus with some PVC's. \"VF\" EGM's show polymorAverage V>A rates were 222bpm.  One NSVT following this event showing brief V>A rates.    ICD interrogation 4/19/22 ([employer] [employer]):  Routine dual chamber ICD remote transmission complete.  Battery longevity estimates 10.6 years.    9 VT, 90 NSVT and 1 SVT episodes detected.  VHR episodes lasted up to 6 minutes 18 seconds on 1/15/22 with average Vrates 164-214bpm.  No therapies.   Heart rate histograms show good rate variation without tachy trend.  AP 48.6% RV pace 0.6%.  1 short interval count noted.  Stable lead performance with review of lead trending graphs.      Sarcoid " history:  Initial presentation:  VT 12/2020  Organs involved: ?heart  Cardiac sarcoidosis? Previous felt to be non-sarcoid cardiac abnormality (2/26/21 Multidisciplinary Cardiac Sarcoidosis Conference)  Tissue diagnosis:  no  Device:   ICD 12/2020   Arrhythmias:   VT 12/2020; recurrent VT 4/2022 and 5/2022  CMR:    12/24/20 ([facility])  PET:    2/11/21  LVEF:    LVEF~50% CMR 12/24/20   Immunosuppresion:  None      PAST MEDICAL HISTORY:  VT   NICM  Liver fibrosis   DM type 2     CURRENT MEDICATIONS:  Current Outpatient Medications   Medication Sig Dispense Refill     albuterol (PROAIR HFA/PROVENTIL HFA/VENTOLIN HFA) 108 (90 Base) MCG/ACT inhaler Inhale 2 puffs into the lungs every 4 hours as needed for wheezing (Patient not taking: Reported on 8/10/2021)       blood glucose (ACCU-CHEK GUIDE) test strip USE TO TEST 3 TIMES DAILY.       blood glucose monitoring (ACCU-CHEK FASTCLIX) lancets USE TO TEST BLOOD SUGAR THREE TIMES A DAY       fluticasone (FLONASE) 50 MCG/ACT nasal spray PLACE 2 SPRAYS INTO BOTH NOSTRILS D. USE UP TO 1 MONTH (Patient not taking: Reported on 8/10/2021)       ibuprofen (ADVIL/MOTRIN) 600 MG tablet Take 600 mg by mouth every 6 hours as needed for pain (Patient not taking: Reported on 8/10/2021)       insulin glargine (LANTUS SOLOSTAR) 100 UNIT/ML pen Inject 15 Units Subcutaneous every evening       insulin pen needle (BD RONAL U/F) 32G X 4 MM miscellaneous        sitagliptin (JANUVIA) 50 MG tablet Take 50 mg by mouth daily         PAST SURGICAL HISTORY:  No past surgical history on file.    ALLERGIES:   No Known Allergies    FAMILY HISTORY:  No family history of premature CAD or sudden death.  Dad colon cancer  Mom breast cancer, lymphoma    SOCIAL HISTORY:  Social History     Tobacco Use     Smoking status: Former Smoker     Smokeless tobacco: Never Used   Substance Use Topics     Alcohol use: Not Currently     Drug use: Yes     Types: Marijuana   Medical marijuana for sleep     ROS:   A  comprehensive 14 point review of systems is negative other than as mentioned in HPI.    Exam:  There were no vitals taken for this visit.   GENERAL APPEARANCE: healthy, alert and no distress  EYES: no icterus, no xanthelasmas  ENT: normal palate, mucosa moist, no central cyanosis  NECK: JVP not elevated  RESPIRATORY: lungs clear to auscultation - no rales, rhonchi or wheezes, no use of accessory muscles, no retractions, respirations are unlabored, normal respiratory rate  CARDIOVASCULAR: regular rhythm, normal S1 with physiologic split S2, no S3 or S4 and no murmur, click or rub.  GI: soft, non tender, bowel sounds normal,no abdominal bruits  EXTREMITIES: no edema, no bruits  NEURO: alert and oriented to person/place/time, normal speech, gait and affect  VASC: Radial, dorsalis pedis and posterior tibialis pulses 2+ bilaterally.  SKIN: no ecchymoses, no rashes.  PSYCH: cooperative, affect appropriate.     Labs:  Reviewed.     Testing/Procedures:  CMR 12/24/20 ([facility], my read): Borderine-reduced LV function. Midmyocardial septal LGE, extensive lateral wall LGE, which appears confluent on some views.     NH3/FDG-PET 2/11/21 (reviewed by me):   No cardiac FDG uptake to suggest active cardiac sarcoidosis.  No extracardiac FDG uptake to suggest systemic/extracardiac sarcoidosis.  No focal perfusion defects.      TTE 2/24/21 (read by me):  Borderline (EF 50-55%) reduced left ventricular function is present. LVEF 53%  based on biplane 2D tracing.  Right ventricular function, chamber size, wall motion, and thickness are  normal.  No significant valvular abnormalities were noted.  Previous study not available for comparison.    Outside records from [facility] were obtained, and relevant results/notes have been incorporated into HPI.    Outside results of note:   ICD interrogation 5/24/22 ([facility]):  Dual chamber ICD remote evaluation completed to assess pt getting 2 ICD shocks a couple days ago. Please refer to  "telephone encounter, pt sent the remote manually when he got home from work yesterday.    Since April 18, 2022, there had been one treated VF detection, 4 NSVT and one AT/AF detection.  All detections were from May 22, 2022 between 16:42-16:44PM.    ATAF detection show was the first detection and shows atrial flutter lasting about one minute, 49 seconds, however, there are NSVT at this time as well, appears to be in sinus tach with runs of NSVT and VF detection was at 16:43 with ventricular rates at 222bpm .  Burst during charge had no affect on the rhythm and ICD shock was delivered with continued V>A rates, 2nd shock delivered converting to sinus with some PVC's. \"VF\" EGM's show polymorAverage V>A rates were 222bpm.  One NSVT following this event showing brief V>A rates.    ICD interrogation 4/19/22 ([facility]):  Routine dual chamber ICD remote transmission complete.  Battery longevity estimates 10.6 years.    9 VT, 90 NSVT and 1 SVT episodes detected.  VHR episodes lasted up to 6 minutes 18 seconds on 1/15/22 with average Vrates 164-214bpm.  No therapies.   Heart rate histograms show good rate variation without tachy trend.  AP 48.6% RV pace 0.6%.  1 short interval count noted.  Stable lead performance with review of lead trending graphs.      ICD transmission 8/10/21:  Routine dual chamber ICD remote transmission complete.  Battery longevity   estimates 11.5 years.  2 NSVT episodes noted lasting 7 to 15 beats.   Durations 2-3 sec, Avg V-rates 200-240 BPM. NO therapies noted.  Heart rate    histograms show good rate variation without tachy trend. AP 61.9% RV pace   1.0%.  No short interval counts noted.  Stable lead performance with review    of lead trending graphs.       Procedures:   12/23 EPS revealing inducible VT with different morphology from clinical arrhythmia and also pleomorphic PVCs. Patient also with significant AVN conduction disease with AV block at low atrial pacing rates.    12/24 Dual " chamber ICD implant:   Conclusions   Successful dual chamber ICDimplant   VF induction with T wave shock --> VF--> VT    (LBB morphology , LAD)--> shock 15 J --> NSR   VF induction with 50 Hz pacing--> VF--->15 J shock-->   NSR     Assessment and Plan:     1. VT, polymorphic s/p [employer] dual chamber ICD, now recurrent and worsening on 4/2022 and 5/2022 ICD transmissions  2. Appropriate ICD shock for sustained VT 5/2022  3. Possible cardiac sarcoidosis, with concern for reactivation (versus recurrent scar-mediated VT in quiescent isolated cardiac sarcoidosis or non-sarcoidosis NICM)   4. Chronic systolic HF due to NICM of unclear etiology, LVEF~50% CMR 12/2020    Brad has nonischemic cardiomyopathy with significant late gadolinium enhancement on cardiac MRI previously felt to represent a possibly genetic cardiomyopathy.  The etiology of this cardiomyopathy was never fully identified and now with progressively worsening ventricular tachycardia burden including appropriate ICD shock 5/22/22, the possibility that this cardiomyopathy represented an atypical phenotype of previously quiescent isolated cardiac sarcoidosis must be entertained.     The next appropriate steps would be to proceed with advanced imaging. Cardiac MRI will allow for reassessment of LVEF, identification of scar/inflammation on late gadolinium enhancement (in comparison to the prior study) which would suggest new or worsening substrate for ventricular arrhythmias and provided imaging phenotype to aid in diagnosis. FDG-PET will allow for identification of new active inflammation both within the heart (I.e, active cardiac sarcoidosis) and in extracardiac sites, which would both support the diagnosis of cardiac sarcoidosis and allow for definitive histologic diagnosis if present.    Given the lack of clarity in the past regarding the precise etiology of cardiomyopathy, both of these imaging modalities will try complementary data in identifying  the precise etiology of VT and determining whether increasing VT burden is related to scar-mediated VT (either in quiescent cardiac sarcoidosis or a non-sarcoidosis NICM) or related to active myocardial inflammation in cardiac sarcoidosis.    This has very clear treatment and prognostic indications with regard to initiation of immunosuppression, heart failure therapy, and management of VT and the combination of repeat CMR and FDG-PET will provide this information, which would not be obtainable from either modality alone or from other imaging modalities (I.e., TTE or MPI.) If increased VT burden is related to inflammation from previously-undiagnosed and newly-reactivated cardiac sarcoidosis, there is a level of time sensitivity to identification of this within a window during which it will be amenable to immunosuppression.     Plan for CMR and FDG-PET as soon as these can possibly be done (reimbursement has posed a challenge as of the time of this visit.)  It would be strongly preferable that both of these tests to be done at [facility] to ensure as definitive a diagnosis as possible and minimize delays in treatment due to diagnostic uncertainty. This is particularly true given his somewhat undifferentiated imaging phenotype (which CMR using our specific protocols would be more likely to accurately characterize to direct treatment) and especially as our cardiac sarcoidosis-focused dietary preparation for PET has been shown to reduce the likelihood of non-diagnostic or false-positive FDG-PET for cardiac sarcoidosis. (Yousif et al. Journal of Nuclear Medicine Mar 2021, [id number]; DOI: 10.2967/jnumed.[id number])     Brad's case will be discussed at Multidisciplinary Cardiac Sarcoidosis Conference (by which time new imaging data will hopefully be available) with further recommendations to follow after that time.     Chart review time: 47 minutes  Visit time: 21 minutes  Total time: 69 minutes    Sean Garza  MD  Cardiology

## 2022-06-30 ENCOUNTER — TELEPHONE (OUTPATIENT)
Dept: CARDIOLOGY | Facility: CLINIC | Age: 57
End: 2022-06-30

## 2022-06-30 NOTE — TELEPHONE ENCOUNTER
I returned the call to [facility]. They let me know that patient is out of network for the PET scan. They sent a PA to his insurance and their response was that he has some out of network benefits that could be used towards this test, but would first need to meet a $20,000 deductible. He has only met $88.00 of that deductible.    He has had a PET scan done here before, but now he has a different insurance which is why he is not being approved this time.    Patient does not want to schedule at this time unless we know that the test will be covered by insurance. He may be able to have the scan done at a different location, but he would need to call his insurance company to figure out where else this could be done.    Regina Armenta RN

## 2022-06-30 NOTE — TELEPHONE ENCOUNTER
Health Call Center    Phone Message    May a detailed message be left on voicemail: yes     Reason for Call: Other: [facility] called today to speak with .  ordered a cardiac pet scan, but the facility would like him to know that his insurance is not in network with them. Please call [facility] for further details, thank you.     Action Taken: Message routed to:  Other: Cardiology    Travel Screening: Not Applicable

## 2022-07-07 NOTE — TELEPHONE ENCOUNTER
Called Cost of care team.  They indicated that his CMRI would like cost around $1300 and that would be applied to his [employer] benefits.  They suggested that his PET scan would be around $2200.      Called [employer] to follow up on benefit.  Was informed that because he had a letter allowing for 3 visits and imaging to be done at [facility] that CMRI would cost $900 plus 95% coverage on the last $400 to get to $1300.  Was recommended that I fill out another PA request for In network benefits specifically for the [facility].      Have called and left message with [facility] to determine if they have started this process and will need clinic tax ID.    7/8- spoke with [facility]- they have not started In network PA.  Faxed today.

## 2022-07-12 ENCOUNTER — MYC MEDICAL ADVICE (OUTPATIENT)
Dept: CARDIOLOGY | Facility: CLINIC | Age: 57
End: 2022-07-12

## 2022-07-12 NOTE — TELEPHONE ENCOUNTER
Followed up with [employer] today.  Per Kitty, he has been approved for 1 visit of In- network benefits within the next 6 months at the [facility].  Auth number [id number].    For exact pricing Brad would need CPT codes and talk to his claims office through [employer].

## 2022-07-14 ENCOUNTER — HOSPITAL ENCOUNTER (OUTPATIENT)
Dept: MRI IMAGING | Facility: CLINIC | Age: 57
Discharge: HOME | End: 2022-07-14
Attending: INTERNAL MEDICINE
Payer: COMMERCIAL

## 2022-07-14 ENCOUNTER — HOSPITAL ENCOUNTER (OUTPATIENT)
Dept: GENERAL RADIOLOGY | Facility: CLINIC | Age: 57
Discharge: HOME | End: 2022-07-14
Attending: INTERNAL MEDICINE
Payer: COMMERCIAL

## 2022-07-14 ENCOUNTER — ANCILLARY PROCEDURE (OUTPATIENT)
Dept: CARDIOLOGY | Facility: CLINIC | Age: 57
End: 2022-07-14
Attending: INTERNAL MEDICINE
Payer: COMMERCIAL

## 2022-07-14 VITALS — HEART RATE: 70 BPM | OXYGEN SATURATION: 96 %

## 2022-07-14 DIAGNOSIS — D86.85 CARDIAC SARCOIDOSIS: ICD-10-CM

## 2022-07-14 DIAGNOSIS — I47.20 VENTRICULAR TACHYCARDIA (H): ICD-10-CM

## 2022-07-14 DIAGNOSIS — I42.8 NICM (NONISCHEMIC CARDIOMYOPATHY) (H): ICD-10-CM

## 2022-07-14 DIAGNOSIS — I47.20 SUSTAINED VT (VENTRICULAR TACHYCARDIA) (H): ICD-10-CM

## 2022-07-14 DIAGNOSIS — I47.20 VENTRICULAR TACHYCARDIA (H): Primary | ICD-10-CM

## 2022-07-14 LAB
MDC_IDC_LEAD_IMPLANT_DT: NORMAL
MDC_IDC_LEAD_LOCATION: NORMAL
MDC_IDC_LEAD_LOCATION_DETAIL_1: NORMAL
MDC_IDC_LEAD_MFG: NORMAL
MDC_IDC_LEAD_MODEL: NORMAL
MDC_IDC_LEAD_POLARITY_TYPE: NORMAL
MDC_IDC_LEAD_SERIAL: NORMAL
MDC_IDC_LEAD_SPECIAL_FUNCTION: NORMAL
MDC_IDC_MSMT_BATTERY_DTM: NORMAL
MDC_IDC_MSMT_BATTERY_DTM: NORMAL
MDC_IDC_MSMT_BATTERY_REMAINING_LONGEVITY: 115 MO
MDC_IDC_MSMT_BATTERY_REMAINING_LONGEVITY: 118 MO
MDC_IDC_MSMT_BATTERY_RRT_TRIGGER: NORMAL
MDC_IDC_MSMT_BATTERY_RRT_TRIGGER: NORMAL
MDC_IDC_MSMT_BATTERY_STATUS: NORMAL
MDC_IDC_MSMT_BATTERY_STATUS: NORMAL
MDC_IDC_MSMT_BATTERY_VOLTAGE: 3 V
MDC_IDC_MSMT_BATTERY_VOLTAGE: 3.01 V
MDC_IDC_MSMT_CAP_CHARGE_DTM: NORMAL
MDC_IDC_MSMT_CAP_CHARGE_DTM: NORMAL
MDC_IDC_MSMT_CAP_CHARGE_ENERGY: 40 J
MDC_IDC_MSMT_CAP_CHARGE_ENERGY: 40 J
MDC_IDC_MSMT_CAP_CHARGE_TIME: 9.7 S
MDC_IDC_MSMT_CAP_CHARGE_TIME: 9.7 S
MDC_IDC_MSMT_CAP_CHARGE_TYPE: NORMAL
MDC_IDC_MSMT_CAP_CHARGE_TYPE: NORMAL
MDC_IDC_MSMT_LEADCHNL_RA_IMPEDANCE_VALUE: 608 OHM
MDC_IDC_MSMT_LEADCHNL_RA_IMPEDANCE_VALUE: 684 OHM
MDC_IDC_MSMT_LEADCHNL_RA_PACING_THRESHOLD_AMPLITUDE: 1.5 V
MDC_IDC_MSMT_LEADCHNL_RA_PACING_THRESHOLD_AMPLITUDE: 1.5 V
MDC_IDC_MSMT_LEADCHNL_RA_PACING_THRESHOLD_PULSEWIDTH: 0.4 MS
MDC_IDC_MSMT_LEADCHNL_RA_PACING_THRESHOLD_PULSEWIDTH: 0.4 MS
MDC_IDC_MSMT_LEADCHNL_RA_SENSING_INTR_AMPL: 0.5 MV
MDC_IDC_MSMT_LEADCHNL_RA_SENSING_INTR_AMPL: 0.6 MV
MDC_IDC_MSMT_LEADCHNL_RV_IMPEDANCE_VALUE: 285 OHM
MDC_IDC_MSMT_LEADCHNL_RV_IMPEDANCE_VALUE: 323 OHM
MDC_IDC_MSMT_LEADCHNL_RV_IMPEDANCE_VALUE: 399 OHM
MDC_IDC_MSMT_LEADCHNL_RV_IMPEDANCE_VALUE: 437 OHM
MDC_IDC_MSMT_LEADCHNL_RV_PACING_THRESHOLD_AMPLITUDE: 0.5 V
MDC_IDC_MSMT_LEADCHNL_RV_PACING_THRESHOLD_AMPLITUDE: 0.75 V
MDC_IDC_MSMT_LEADCHNL_RV_PACING_THRESHOLD_PULSEWIDTH: 0.4 MS
MDC_IDC_MSMT_LEADCHNL_RV_PACING_THRESHOLD_PULSEWIDTH: 0.4 MS
MDC_IDC_MSMT_LEADCHNL_RV_SENSING_INTR_AMPL: 8.9 MV
MDC_IDC_MSMT_LEADCHNL_RV_SENSING_INTR_AMPL: 9.8 MV
MDC_IDC_PG_IMPLANT_DTM: NORMAL
MDC_IDC_PG_IMPLANT_DTM: NORMAL
MDC_IDC_PG_MFG: NORMAL
MDC_IDC_PG_MFG: NORMAL
MDC_IDC_PG_MODEL: NORMAL
MDC_IDC_PG_MODEL: NORMAL
MDC_IDC_PG_SERIAL: NORMAL
MDC_IDC_PG_SERIAL: NORMAL
MDC_IDC_PG_TYPE: NORMAL
MDC_IDC_PG_TYPE: NORMAL
MDC_IDC_SESS_CLINIC_NAME: NORMAL
MDC_IDC_SESS_CLINIC_NAME: NORMAL
MDC_IDC_SESS_DTM: NORMAL
MDC_IDC_SESS_DTM: NORMAL
MDC_IDC_SESS_TYPE: NORMAL
MDC_IDC_SESS_TYPE: NORMAL
MDC_IDC_SET_BRADY_AT_MODE_SWITCH_RATE: 171 {BEATS}/MIN
MDC_IDC_SET_BRADY_AT_MODE_SWITCH_RATE: 171 {BEATS}/MIN
MDC_IDC_SET_BRADY_LOWRATE: 50 {BEATS}/MIN
MDC_IDC_SET_BRADY_LOWRATE: 50 {BEATS}/MIN
MDC_IDC_SET_BRADY_MAX_SENSOR_RATE: 130 {BEATS}/MIN
MDC_IDC_SET_BRADY_MAX_SENSOR_RATE: 130 {BEATS}/MIN
MDC_IDC_SET_BRADY_MAX_TRACKING_RATE: 130 {BEATS}/MIN
MDC_IDC_SET_BRADY_MAX_TRACKING_RATE: 130 {BEATS}/MIN
MDC_IDC_SET_BRADY_MODE: NORMAL
MDC_IDC_SET_BRADY_MODE: NORMAL
MDC_IDC_SET_BRADY_PAV_DELAY_LOW: 180 MS
MDC_IDC_SET_BRADY_PAV_DELAY_LOW: 180 MS
MDC_IDC_SET_BRADY_SAV_DELAY_LOW: 150 MS
MDC_IDC_SET_BRADY_SAV_DELAY_LOW: 150 MS
MDC_IDC_SET_LEADCHNL_RA_PACING_AMPLITUDE: 2.25 V
MDC_IDC_SET_LEADCHNL_RA_PACING_AMPLITUDE: 2.75 V
MDC_IDC_SET_LEADCHNL_RA_PACING_ANODE_ELECTRODE_1: NORMAL
MDC_IDC_SET_LEADCHNL_RA_PACING_ANODE_ELECTRODE_1: NORMAL
MDC_IDC_SET_LEADCHNL_RA_PACING_ANODE_LOCATION_1: NORMAL
MDC_IDC_SET_LEADCHNL_RA_PACING_ANODE_LOCATION_1: NORMAL
MDC_IDC_SET_LEADCHNL_RA_PACING_CAPTURE_MODE: NORMAL
MDC_IDC_SET_LEADCHNL_RA_PACING_CAPTURE_MODE: NORMAL
MDC_IDC_SET_LEADCHNL_RA_PACING_CATHODE_ELECTRODE_1: NORMAL
MDC_IDC_SET_LEADCHNL_RA_PACING_CATHODE_ELECTRODE_1: NORMAL
MDC_IDC_SET_LEADCHNL_RA_PACING_CATHODE_LOCATION_1: NORMAL
MDC_IDC_SET_LEADCHNL_RA_PACING_CATHODE_LOCATION_1: NORMAL
MDC_IDC_SET_LEADCHNL_RA_PACING_POLARITY: NORMAL
MDC_IDC_SET_LEADCHNL_RA_PACING_POLARITY: NORMAL
MDC_IDC_SET_LEADCHNL_RA_PACING_PULSEWIDTH: 0.4 MS
MDC_IDC_SET_LEADCHNL_RA_PACING_PULSEWIDTH: 0.4 MS
MDC_IDC_SET_LEADCHNL_RA_SENSING_ANODE_ELECTRODE_1: NORMAL
MDC_IDC_SET_LEADCHNL_RA_SENSING_ANODE_ELECTRODE_1: NORMAL
MDC_IDC_SET_LEADCHNL_RA_SENSING_ANODE_LOCATION_1: NORMAL
MDC_IDC_SET_LEADCHNL_RA_SENSING_ANODE_LOCATION_1: NORMAL
MDC_IDC_SET_LEADCHNL_RA_SENSING_CATHODE_ELECTRODE_1: NORMAL
MDC_IDC_SET_LEADCHNL_RA_SENSING_CATHODE_ELECTRODE_1: NORMAL
MDC_IDC_SET_LEADCHNL_RA_SENSING_CATHODE_LOCATION_1: NORMAL
MDC_IDC_SET_LEADCHNL_RA_SENSING_CATHODE_LOCATION_1: NORMAL
MDC_IDC_SET_LEADCHNL_RA_SENSING_POLARITY: NORMAL
MDC_IDC_SET_LEADCHNL_RA_SENSING_POLARITY: NORMAL
MDC_IDC_SET_LEADCHNL_RA_SENSING_SENSITIVITY: 0.3 MV
MDC_IDC_SET_LEADCHNL_RA_SENSING_SENSITIVITY: 0.3 MV
MDC_IDC_SET_LEADCHNL_RV_PACING_AMPLITUDE: 2 V
MDC_IDC_SET_LEADCHNL_RV_PACING_AMPLITUDE: 2.25 V
MDC_IDC_SET_LEADCHNL_RV_PACING_ANODE_ELECTRODE_1: NORMAL
MDC_IDC_SET_LEADCHNL_RV_PACING_ANODE_ELECTRODE_1: NORMAL
MDC_IDC_SET_LEADCHNL_RV_PACING_ANODE_LOCATION_1: NORMAL
MDC_IDC_SET_LEADCHNL_RV_PACING_ANODE_LOCATION_1: NORMAL
MDC_IDC_SET_LEADCHNL_RV_PACING_CAPTURE_MODE: NORMAL
MDC_IDC_SET_LEADCHNL_RV_PACING_CAPTURE_MODE: NORMAL
MDC_IDC_SET_LEADCHNL_RV_PACING_CATHODE_ELECTRODE_1: NORMAL
MDC_IDC_SET_LEADCHNL_RV_PACING_CATHODE_ELECTRODE_1: NORMAL
MDC_IDC_SET_LEADCHNL_RV_PACING_CATHODE_LOCATION_1: NORMAL
MDC_IDC_SET_LEADCHNL_RV_PACING_CATHODE_LOCATION_1: NORMAL
MDC_IDC_SET_LEADCHNL_RV_PACING_POLARITY: NORMAL
MDC_IDC_SET_LEADCHNL_RV_PACING_POLARITY: NORMAL
MDC_IDC_SET_LEADCHNL_RV_PACING_PULSEWIDTH: 0.4 MS
MDC_IDC_SET_LEADCHNL_RV_PACING_PULSEWIDTH: 0.4 MS
MDC_IDC_SET_LEADCHNL_RV_SENSING_ANODE_ELECTRODE_1: NORMAL
MDC_IDC_SET_LEADCHNL_RV_SENSING_ANODE_ELECTRODE_1: NORMAL
MDC_IDC_SET_LEADCHNL_RV_SENSING_ANODE_LOCATION_1: NORMAL
MDC_IDC_SET_LEADCHNL_RV_SENSING_ANODE_LOCATION_1: NORMAL
MDC_IDC_SET_LEADCHNL_RV_SENSING_CATHODE_ELECTRODE_1: NORMAL
MDC_IDC_SET_LEADCHNL_RV_SENSING_CATHODE_ELECTRODE_1: NORMAL
MDC_IDC_SET_LEADCHNL_RV_SENSING_CATHODE_LOCATION_1: NORMAL
MDC_IDC_SET_LEADCHNL_RV_SENSING_CATHODE_LOCATION_1: NORMAL
MDC_IDC_SET_LEADCHNL_RV_SENSING_POLARITY: NORMAL
MDC_IDC_SET_LEADCHNL_RV_SENSING_POLARITY: NORMAL
MDC_IDC_SET_LEADCHNL_RV_SENSING_SENSITIVITY: 0.3 MV
MDC_IDC_SET_LEADCHNL_RV_SENSING_SENSITIVITY: 0.3 MV
MDC_IDC_SET_ZONE_DETECTION_BEATS_DENOMINATOR: 40 {BEATS}
MDC_IDC_SET_ZONE_DETECTION_BEATS_DENOMINATOR: 40 {BEATS}
MDC_IDC_SET_ZONE_DETECTION_BEATS_NUMERATOR: 30 {BEATS}
MDC_IDC_SET_ZONE_DETECTION_BEATS_NUMERATOR: 30 {BEATS}
MDC_IDC_SET_ZONE_DETECTION_INTERVAL: 300 MS
MDC_IDC_SET_ZONE_DETECTION_INTERVAL: 300 MS
MDC_IDC_SET_ZONE_DETECTION_INTERVAL: 350 MS
MDC_IDC_SET_ZONE_DETECTION_INTERVAL: 350 MS
MDC_IDC_SET_ZONE_DETECTION_INTERVAL: 360 MS
MDC_IDC_SET_ZONE_DETECTION_INTERVAL: 360 MS
MDC_IDC_SET_ZONE_DETECTION_INTERVAL: 400 MS
MDC_IDC_SET_ZONE_DETECTION_INTERVAL: 400 MS
MDC_IDC_SET_ZONE_TYPE: NORMAL
MDC_IDC_STAT_AT_BURDEN_PERCENT: 0 %
MDC_IDC_STAT_AT_BURDEN_PERCENT: 0 %
MDC_IDC_STAT_AT_DTM_END: NORMAL
MDC_IDC_STAT_AT_DTM_END: NORMAL
MDC_IDC_STAT_AT_DTM_START: NORMAL
MDC_IDC_STAT_AT_DTM_START: NORMAL
MDC_IDC_STAT_BRADY_AP_VP_PERCENT: 0.85 %
MDC_IDC_STAT_BRADY_AP_VP_PERCENT: 0.85 %
MDC_IDC_STAT_BRADY_AP_VS_PERCENT: 51.38 %
MDC_IDC_STAT_BRADY_AP_VS_PERCENT: 51.38 %
MDC_IDC_STAT_BRADY_AS_VP_PERCENT: 0.28 %
MDC_IDC_STAT_BRADY_AS_VP_PERCENT: 0.28 %
MDC_IDC_STAT_BRADY_AS_VS_PERCENT: 47.5 %
MDC_IDC_STAT_BRADY_AS_VS_PERCENT: 47.5 %
MDC_IDC_STAT_BRADY_DTM_END: NORMAL
MDC_IDC_STAT_BRADY_DTM_END: NORMAL
MDC_IDC_STAT_BRADY_DTM_START: NORMAL
MDC_IDC_STAT_BRADY_DTM_START: NORMAL
MDC_IDC_STAT_BRADY_RA_PERCENT_PACED: 52.75 %
MDC_IDC_STAT_BRADY_RA_PERCENT_PACED: 52.75 %
MDC_IDC_STAT_BRADY_RV_PERCENT_PACED: 1.12 %
MDC_IDC_STAT_BRADY_RV_PERCENT_PACED: 1.12 %
MDC_IDC_STAT_CRT_DTM_END: NORMAL
MDC_IDC_STAT_CRT_DTM_END: NORMAL
MDC_IDC_STAT_CRT_DTM_START: NORMAL
MDC_IDC_STAT_CRT_DTM_START: NORMAL
MDC_IDC_STAT_EPISODE_RECENT_COUNT: 0
MDC_IDC_STAT_EPISODE_RECENT_COUNT: 1
MDC_IDC_STAT_EPISODE_RECENT_COUNT: 100
MDC_IDC_STAT_EPISODE_RECENT_COUNT: 100
MDC_IDC_STAT_EPISODE_RECENT_COUNT: 2
MDC_IDC_STAT_EPISODE_RECENT_COUNT: 2
MDC_IDC_STAT_EPISODE_RECENT_COUNT: 9
MDC_IDC_STAT_EPISODE_RECENT_COUNT: 9
MDC_IDC_STAT_EPISODE_RECENT_COUNT_DTM_END: NORMAL
MDC_IDC_STAT_EPISODE_RECENT_COUNT_DTM_START: NORMAL
MDC_IDC_STAT_EPISODE_TOTAL_COUNT: 0
MDC_IDC_STAT_EPISODE_TOTAL_COUNT: 1
MDC_IDC_STAT_EPISODE_TOTAL_COUNT: 110
MDC_IDC_STAT_EPISODE_TOTAL_COUNT: 110
MDC_IDC_STAT_EPISODE_TOTAL_COUNT: 5
MDC_IDC_STAT_EPISODE_TOTAL_COUNT: 5
MDC_IDC_STAT_EPISODE_TOTAL_COUNT: 9
MDC_IDC_STAT_EPISODE_TOTAL_COUNT: 9
MDC_IDC_STAT_EPISODE_TOTAL_COUNT_DTM_END: NORMAL
MDC_IDC_STAT_EPISODE_TOTAL_COUNT_DTM_START: NORMAL
MDC_IDC_STAT_EPISODE_TYPE: NORMAL
MDC_IDC_STAT_TACHYTHERAPY_ATP_DELIVERED_RECENT: 1
MDC_IDC_STAT_TACHYTHERAPY_ATP_DELIVERED_RECENT: 1
MDC_IDC_STAT_TACHYTHERAPY_ATP_DELIVERED_TOTAL: 1
MDC_IDC_STAT_TACHYTHERAPY_ATP_DELIVERED_TOTAL: 1
MDC_IDC_STAT_TACHYTHERAPY_RECENT_DTM_END: NORMAL
MDC_IDC_STAT_TACHYTHERAPY_RECENT_DTM_END: NORMAL
MDC_IDC_STAT_TACHYTHERAPY_RECENT_DTM_START: NORMAL
MDC_IDC_STAT_TACHYTHERAPY_RECENT_DTM_START: NORMAL
MDC_IDC_STAT_TACHYTHERAPY_SHOCKS_ABORTED_RECENT: 0
MDC_IDC_STAT_TACHYTHERAPY_SHOCKS_ABORTED_RECENT: 0
MDC_IDC_STAT_TACHYTHERAPY_SHOCKS_ABORTED_TOTAL: 1
MDC_IDC_STAT_TACHYTHERAPY_SHOCKS_ABORTED_TOTAL: 1
MDC_IDC_STAT_TACHYTHERAPY_SHOCKS_DELIVERED_RECENT: 3
MDC_IDC_STAT_TACHYTHERAPY_SHOCKS_DELIVERED_RECENT: 3
MDC_IDC_STAT_TACHYTHERAPY_SHOCKS_DELIVERED_TOTAL: 5
MDC_IDC_STAT_TACHYTHERAPY_SHOCKS_DELIVERED_TOTAL: 5
MDC_IDC_STAT_TACHYTHERAPY_TOTAL_DTM_END: NORMAL
MDC_IDC_STAT_TACHYTHERAPY_TOTAL_DTM_END: NORMAL
MDC_IDC_STAT_TACHYTHERAPY_TOTAL_DTM_START: NORMAL
MDC_IDC_STAT_TACHYTHERAPY_TOTAL_DTM_START: NORMAL

## 2022-07-14 PROCEDURE — 71046 X-RAY EXAM CHEST 2 VIEWS: CPT

## 2022-07-14 PROCEDURE — A9585 GADOBUTROL INJECTION: HCPCS | Performed by: INTERNAL MEDICINE

## 2022-07-14 PROCEDURE — 71046 X-RAY EXAM CHEST 2 VIEWS: CPT | Mod: 26 | Performed by: RADIOLOGY

## 2022-07-14 PROCEDURE — 93287 PERI-PX DEVICE EVAL & PRGR: CPT | Mod: 26 | Performed by: INTERNAL MEDICINE

## 2022-07-14 PROCEDURE — 93287 PERI-PX DEVICE EVAL & PRGR: CPT

## 2022-07-14 PROCEDURE — 75561 CARDIAC MRI FOR MORPH W/DYE: CPT

## 2022-07-14 PROCEDURE — 75561 CARDIAC MRI FOR MORPH W/DYE: CPT | Mod: 26 | Performed by: INTERNAL MEDICINE

## 2022-07-14 PROCEDURE — 255N000002 HC RX 255 OP 636: Performed by: INTERNAL MEDICINE

## 2022-07-14 RX ORDER — GADOBUTROL 604.72 MG/ML
10 INJECTION INTRAVENOUS ONCE
Status: COMPLETED | OUTPATIENT
Start: 2022-07-14 | End: 2022-07-14

## 2022-07-14 RX ADMIN — GADOBUTROL 10 ML: 604.72 INJECTION INTRAVENOUS at 10:55

## 2022-07-14 NOTE — PROGRESS NOTES
Patient monitored during MRI scan. No complications reported/observed during scan.    Robin Alfonso RN

## 2022-08-12 ENCOUNTER — ANCILLARY PROCEDURE (OUTPATIENT)
Dept: PET IMAGING | Facility: CLINIC | Age: 57
End: 2022-08-12
Attending: INTERNAL MEDICINE
Payer: COMMERCIAL

## 2022-08-12 DIAGNOSIS — I47.20 SUSTAINED VT (VENTRICULAR TACHYCARDIA) (H): ICD-10-CM

## 2022-08-12 DIAGNOSIS — I42.8 NICM (NONISCHEMIC CARDIOMYOPATHY) (H): ICD-10-CM

## 2022-08-12 DIAGNOSIS — D86.85 CARDIAC SARCOIDOSIS: ICD-10-CM

## 2022-08-12 LAB — GLUCOSE SERPL-MCNC: 121 MG/DL (ref 70–99)

## 2022-09-24 ENCOUNTER — HEALTH MAINTENANCE LETTER (OUTPATIENT)
Age: 57
End: 2022-09-24

## 2023-01-30 ENCOUNTER — HEALTH MAINTENANCE LETTER (OUTPATIENT)
Age: 58
End: 2023-01-30

## 2023-05-08 ENCOUNTER — HEALTH MAINTENANCE LETTER (OUTPATIENT)
Age: 58
End: 2023-05-08

## 2023-10-14 ENCOUNTER — HEALTH MAINTENANCE LETTER (OUTPATIENT)
Age: 58
End: 2023-10-14

## 2024-03-02 ENCOUNTER — HEALTH MAINTENANCE LETTER (OUTPATIENT)
Age: 59
End: 2024-03-02

## 2024-05-11 ENCOUNTER — HEALTH MAINTENANCE LETTER (OUTPATIENT)
Age: 59
End: 2024-05-11

## 2024-07-14 ENCOUNTER — HEALTH MAINTENANCE LETTER (OUTPATIENT)
Age: 59
End: 2024-07-14

## 2024-12-01 ENCOUNTER — HEALTH MAINTENANCE LETTER (OUTPATIENT)
Age: 59
End: 2024-12-01

## 2025-03-15 ENCOUNTER — HEALTH MAINTENANCE LETTER (OUTPATIENT)
Age: 60
End: 2025-03-15

## 2025-05-17 ENCOUNTER — HEALTH MAINTENANCE LETTER (OUTPATIENT)
Age: 60
End: 2025-05-17

## 2025-06-28 ENCOUNTER — HEALTH MAINTENANCE LETTER (OUTPATIENT)
Age: 60
End: 2025-06-28